# Patient Record
Sex: MALE | Race: WHITE
[De-identification: names, ages, dates, MRNs, and addresses within clinical notes are randomized per-mention and may not be internally consistent; named-entity substitution may affect disease eponyms.]

---

## 2020-09-25 ENCOUNTER — HOSPITAL ENCOUNTER (OUTPATIENT)
Dept: HOSPITAL 53 - M IRPRO | Age: 80
End: 2020-09-25
Attending: SURGERY
Payer: MEDICARE

## 2020-09-25 VITALS — DIASTOLIC BLOOD PRESSURE: 63 MMHG | SYSTOLIC BLOOD PRESSURE: 163 MMHG

## 2020-09-25 DIAGNOSIS — I12.0: ICD-10-CM
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
## 2020-09-25 DIAGNOSIS — T82.590A: Primary | ICD-10-CM

## 2020-09-25 DIAGNOSIS — Z99.2: ICD-10-CM

## 2020-09-25 DIAGNOSIS — Z79.82: ICD-10-CM

## 2020-09-25 DIAGNOSIS — N18.6: ICD-10-CM

## 2020-09-25 DIAGNOSIS — Z79.899: ICD-10-CM

## 2020-09-25 PROCEDURE — 99152 MOD SED SAME PHYS/QHP 5/>YRS: CPT

## 2020-09-25 PROCEDURE — 36902 INTRO CATH DIALYSIS CIRCUIT: CPT

## 2020-09-25 PROCEDURE — 99153 MOD SED SAME PHYS/QHP EA: CPT

## 2020-09-25 NOTE — ROOPDOC
[facility] Report Of Operation


Report of Operation


DATE OF PROCEDURE: 20





PREPROCEDURE DIAGNOSES: End-stage renal disease with increased pulsatility and 

bleeding left upper extremity brachial basilic AV fistula





POSTPROCEDURE DIAGNOSES: Same





PROCEDURE: 


1. Ultrasound-guided access left basilic vein


2. Left upper extremity fistulogram and central venogram


3. Angioplasty left basilic vein and subclavian vein pre-existing stent with 8 x

20 cutting balloon and 9 x 40 Riverhead balloon


4. Angioplasty left subclavian vein pre-existing stent with 10 x 40 conquest 

balloon


5. Completion venogram





SURGEON: Jaxson Brown MD





ANESTHESIA: Local anesthesia with 3 mL lidocaine. Moderate intravenous conscious

sedation with administered by Dr. Brown. The patient was independently 

monitored by registered nurse assigned to the Department of radiology using auto

mated blood pressure, EKG, and pulse oximetry. The detailed sedation record is 

permanently stored in the hospital information system. The following is a brief 

sedation record: Start time 15:42, stop time 16:18, Versed 0.5 mg IV, fentanyl 

50 g IV





CONTRAST: 38 mL Isovue-[id number]





INDICATION FOR PROCEDURE: This is a very pleasant 80-year-old gentleman with 

end-stage renal disease who  increased pulsatility and bleeding times after 

dialysis with his left upper extremity brachial basilic AV fistula. Risks benef

its alternatives to a fistulogram potential intervention were explained to the 

patient needs agreeable to proceed. Informed consent was obtained.





INTERPRETATION:


1. Ultrasound confirmed the AV anastomosis is widely patent.


2. The basilic vein is widely patent over the upper arm and is aneurysmal and 

areas of frequent access. There is one focal area of stenosis, approximately 70%

stenosis distal to one of the aneurysmal segments in the mid upper arm. Proximal

to this in the basilic and axillary vein, there is no significant stenosis. 

There is an 80% stenosis within the pre-existing left subclavian stent, and 

otherwise the central veins are widely patent.


3. After angioplasty of the subclavian stent and the basilic vein with a 8 x 20 

cutting balloon, there was less than 20% residual stenosis in the basilic vein 

and 60% residual stenosis in the subclavian vein. No extravasation was noted.


4. After angioplasty of both areas with a 9 x 40 Riverhead balloon, there is 

widely patent inflow through the basilic vein with no significant residual 

stenosis in no extravasation, and there was an improvement inflow through the 

left subclavian vein but still about 30-40% residual stenosis.


5. After angioplasty of the cephalic vein with a 10 x 40 high-pressure conquest 

balloon at high pressures for 3 minutes, there was less than 20% residual 

stenosis in the subclavian vein stent with widely patent flow and a marked 

improvement in thrill in the fistula. Position was noted.





REPORT OF OPERATION: The patient was brought to the angiographic suite in stable

condition. His left upper extremity was prepped and draped in a sterile fashion.

A timeout was performed. Local anesthesia was administered to skin and 

subcutaneous tissue over the left basilic vein. Ultrasound was used to examine t

he AV anastomosis and it was found to be widely patent. We then gained access to

the basilic vein antegrade fashion with a microneedle under ultrasound guidance.

A wire was passed through this access the needle was removed and a 4 Portuguese 

sheath was placed and flushed with saline. A fistulogram and central venogram 

were performed. Please interpretation above. A Glidewire was advanced through 

this access in the central system and the sheath was exchanged for some Portuguese 

sheath and flushed with saline. A Gleich cath was placed over the wire and the 

wire was exchanged for a 018 wire. We then advanced an 8 x 20 cutting balloon 

first to cross the subclavian stent in multiple inflations were performed. 

Contrast injection revealed some improvement inflow with some residual stenosis 

as described above, but no extravasation. We then angioplasty with the balloon 

in the basilic vein at the area stenosis, and there is a marked improvement 

inflow. Still some residual stenosis was present, no extravasation. We then 

exchange the balloon for 9 x 40 Riverhead balloon. Both areas had a three-minute 

inflations, multiple inflations across the subclavian. Following this there was 

no significant residual stenosis in the basilic vein with a marked rapid 

improvement inflow, but there is still some residual stenosis in subclavian 

vein. We exchange the balloon therefore for a 10 x 40 conquest balloon and 

high-pressure three-minute inflations was performed with a marked improvement 

inflow through the subclavian vein, no significant residual stenosis, no 

extravasation, and a marked improvement in thrill in the fistula. This concluded

the procedure. A figure-of-eight Prolene suture was placed at the sheath after 

anesthetizing with local anesthesia, and the suture was secured is the sheath 

was removed for good hemostasis. Pressure was held for a few minutes and sterile

dressings were applied. The patient was then taken to recovery in stable 

condition. He tolerated the procedure and the sedation well.





ESTIMATED BLOOD LOSS: Approximately 5 mL. 





COMPLICATIONS: None. 





PLAN: It is okay to use the fistula for dialysis. Okay to resume home diet 

medications. Would like to see the patient back in 3 months to check his 

fistula. We appreciate the opportunity to participate in the care of this 

patient.











JAXSON BROWN MD         Sep 25, 2020 16:39

## 2021-03-09 ENCOUNTER — HOSPITAL ENCOUNTER (INPATIENT)
Dept: HOSPITAL 53 - M ED | Age: 81
LOS: 9 days | Discharge: HOME | DRG: 291 | End: 2021-03-18
Attending: INTERNAL MEDICINE | Admitting: FAMILY MEDICINE
Payer: COMMERCIAL

## 2021-03-09 VITALS — BODY MASS INDEX: 27.67 KG/M2 | WEIGHT: 182.54 LBS | HEIGHT: 68 IN

## 2021-03-09 VITALS — OXYGEN SATURATION: 94 %

## 2021-03-09 DIAGNOSIS — Z85.828: ICD-10-CM

## 2021-03-09 DIAGNOSIS — Z95.0: ICD-10-CM

## 2021-03-09 DIAGNOSIS — H54.8: ICD-10-CM

## 2021-03-09 DIAGNOSIS — D63.1: ICD-10-CM

## 2021-03-09 DIAGNOSIS — E87.6: ICD-10-CM

## 2021-03-09 DIAGNOSIS — Z87.891: ICD-10-CM

## 2021-03-09 DIAGNOSIS — E87.5: ICD-10-CM

## 2021-03-09 DIAGNOSIS — J44.0: ICD-10-CM

## 2021-03-09 DIAGNOSIS — B37.81: ICD-10-CM

## 2021-03-09 DIAGNOSIS — E87.1: ICD-10-CM

## 2021-03-09 DIAGNOSIS — Z79.899: ICD-10-CM

## 2021-03-09 DIAGNOSIS — Z90.49: ICD-10-CM

## 2021-03-09 DIAGNOSIS — R13.10: ICD-10-CM

## 2021-03-09 DIAGNOSIS — E11.22: ICD-10-CM

## 2021-03-09 DIAGNOSIS — E11.65: ICD-10-CM

## 2021-03-09 DIAGNOSIS — Z66: ICD-10-CM

## 2021-03-09 DIAGNOSIS — N18.6: ICD-10-CM

## 2021-03-09 DIAGNOSIS — J18.9: ICD-10-CM

## 2021-03-09 DIAGNOSIS — E11.319: ICD-10-CM

## 2021-03-09 DIAGNOSIS — J44.1: ICD-10-CM

## 2021-03-09 DIAGNOSIS — L98.9: ICD-10-CM

## 2021-03-09 DIAGNOSIS — K21.9: ICD-10-CM

## 2021-03-09 DIAGNOSIS — I13.2: Primary | ICD-10-CM

## 2021-03-09 DIAGNOSIS — B37.0: ICD-10-CM

## 2021-03-09 DIAGNOSIS — I95.9: ICD-10-CM

## 2021-03-09 DIAGNOSIS — I50.33: ICD-10-CM

## 2021-03-09 DIAGNOSIS — Z20.822: ICD-10-CM

## 2021-03-09 DIAGNOSIS — Z95.5: ICD-10-CM

## 2021-03-09 DIAGNOSIS — Z99.2: ICD-10-CM

## 2021-03-09 DIAGNOSIS — I48.20: ICD-10-CM

## 2021-03-09 DIAGNOSIS — J96.01: ICD-10-CM

## 2021-03-09 DIAGNOSIS — Z79.82: ICD-10-CM

## 2021-03-09 LAB
ALBUMIN SERPL BCG-MCNC: 3.4 GM/DL (ref 3.2–5.2)
ALT SERPL W P-5'-P-CCNC: 11 U/L (ref 12–78)
BASE EXCESS BLDV CALC-SCNC: 6.5 MMOL/L (ref -2–2)
BASOPHILS # BLD AUTO: 0 10^3/UL (ref 0–0.2)
BASOPHILS NFR BLD AUTO: 0.1 % (ref 0–1)
BILIRUB CONJ SERPL-MCNC: 0.3 MG/DL (ref 0–0.2)
BILIRUB SERPL-MCNC: 0.9 MG/DL (ref 0.2–1)
BUN SERPL-MCNC: 24 MG/DL (ref 7–18)
CALCIUM SERPL-MCNC: 8.3 MG/DL (ref 8.8–10.2)
CHLORIDE SERPL-SCNC: 98 MEQ/L (ref 98–107)
CK MB CFR.DF SERPL CALC: 1.26
CK MB SERPL-MCNC: 1.4 NG/ML (ref ?–3.6)
CK SERPL-CCNC: 111 U/L (ref 39–308)
CO2 BLDV CALC-SCNC: 33.8 MEQ/L (ref 24–28)
CO2 SERPL-SCNC: 32 MEQ/L (ref 21–32)
CREAT SERPL-MCNC: 3.54 MG/DL (ref 0.7–1.3)
EOSINOPHIL # BLD AUTO: 0.1 10^3/UL (ref 0–0.5)
EOSINOPHIL NFR BLD AUTO: 0.7 % (ref 0–3)
GFR SERPL CREATININE-BSD FRML MDRD: 17.7 ML/MIN/{1.73_M2} (ref 35–?)
GLUCOSE SERPL-MCNC: 124 MG/DL (ref 70–100)
HCO3 BLDV-SCNC: 32.2 MEQ/L (ref 23–27)
HCO3 STD BLDV-SCNC: 29.7 MEQ/L
HCT VFR BLD AUTO: 29 % (ref 42–52)
HGB BLD-MCNC: 9 G/DL (ref 13.5–17.5)
INR PPP: 1.35
LYMPHOCYTES # BLD AUTO: 1 10^3/UL (ref 1.5–5)
LYMPHOCYTES NFR BLD AUTO: 8.8 % (ref 24–44)
MCH RBC QN AUTO: 31.8 PG (ref 27–33)
MCHC RBC AUTO-ENTMCNC: 31 G/DL (ref 32–36.5)
MCV RBC AUTO: 102.5 FL (ref 80–96)
MONOCYTES # BLD AUTO: 1 10^3/UL (ref 0–0.8)
MONOCYTES NFR BLD AUTO: 8.9 % (ref 2–8)
NEUTROPHILS # BLD AUTO: 9.1 10^3/UL (ref 1.5–8.5)
NEUTROPHILS NFR BLD AUTO: 81.1 % (ref 36–66)
PCO2 BLDV: 52.5 MMHG (ref 38–50)
PH BLDV: 7.41 UNITS (ref 7.33–7.43)
PLATELET # BLD AUTO: 173 10^3/UL (ref 150–450)
PO2 BLDV: 32.3 MMHG (ref 30–50)
POTASSIUM SERPL-SCNC: 3.3 MEQ/L (ref 3.5–5.1)
PROT SERPL-MCNC: 7.6 GM/DL (ref 6.4–8.2)
PROTHROMBIN TIME: 17 SECONDS (ref 12.5–14.3)
RBC # BLD AUTO: 2.83 10^6/UL (ref 4.3–6.1)
RSV RNA NPH QL NAA+PROBE: NEGATIVE
SAO2 % BLDV: 60.3 % (ref 60–80)
SODIUM SERPL-SCNC: 137 MEQ/L (ref 136–145)
TROPONIN I SERPL-MCNC: 0.03 NG/ML (ref ?–0.1)
WBC # BLD AUTO: 11.2 10^3/UL (ref 4–10)

## 2021-03-09 RX ADMIN — INSULIN LISPRO SCH UNITS: 100 INJECTION, SOLUTION INTRAVENOUS; SUBCUTANEOUS at 21:00

## 2021-03-09 RX ADMIN — SODIUM CHLORIDE SCH UNITS: 4.5 INJECTION, SOLUTION INTRAVENOUS at 22:49

## 2021-03-09 RX ADMIN — METOPROLOL TARTRATE SCH MG: 25 TABLET, FILM COATED ORAL at 22:48

## 2021-03-09 NOTE — REP
INDICATION:

DYSPNEA/COUGH



COMPARISON:

None.



TECHNIQUE:

Portable AP view of the chest



FINDINGS:

Evidence for prior sternotomy and CABG lung fields demonstrate chronic interstitial

changes.  Basilar atelectasis cannot be excluded.  Stent graft overlies the left apex.



IMPRESSION:

Chronic changes.  Possible basilar atelectasis.





<Electronically signed by Po Rodriguez > 03/09/21 [id number]
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
## 2021-03-10 VITALS — SYSTOLIC BLOOD PRESSURE: 104 MMHG | DIASTOLIC BLOOD PRESSURE: 60 MMHG

## 2021-03-10 VITALS — SYSTOLIC BLOOD PRESSURE: 102 MMHG | DIASTOLIC BLOOD PRESSURE: 58 MMHG

## 2021-03-10 VITALS — OXYGEN SATURATION: 92 %

## 2021-03-10 VITALS — OXYGEN SATURATION: 94 %

## 2021-03-10 VITALS — SYSTOLIC BLOOD PRESSURE: 104 MMHG | DIASTOLIC BLOOD PRESSURE: 59 MMHG

## 2021-03-10 VITALS — DIASTOLIC BLOOD PRESSURE: 92 MMHG | SYSTOLIC BLOOD PRESSURE: 102 MMHG

## 2021-03-10 LAB
BASOPHILS # BLD AUTO: 0 10^3/UL (ref 0–0.2)
BASOPHILS NFR BLD AUTO: 0.1 % (ref 0–1)
BUN SERPL-MCNC: 13 MG/DL (ref 7–18)
BUN SERPL-MCNC: 28 MG/DL (ref 7–18)
CALCIUM SERPL-MCNC: 7.9 MG/DL (ref 8.8–10.2)
CALCIUM SERPL-MCNC: 8.3 MG/DL (ref 8.8–10.2)
CHLORIDE SERPL-SCNC: 103 MEQ/L (ref 98–107)
CHLORIDE SERPL-SCNC: 97 MEQ/L (ref 98–107)
CO2 SERPL-SCNC: 29 MEQ/L (ref 21–32)
CO2 SERPL-SCNC: 32 MEQ/L (ref 21–32)
CREAT SERPL-MCNC: 2.37 MG/DL (ref 0.7–1.3)
CREAT SERPL-MCNC: 4.31 MG/DL (ref 0.7–1.3)
EOSINOPHIL # BLD AUTO: 0.1 10^3/UL (ref 0–0.5)
EOSINOPHIL NFR BLD AUTO: 0.9 % (ref 0–3)
GFR SERPL CREATININE-BSD FRML MDRD: 14.1 ML/MIN/{1.73_M2} (ref 35–?)
GFR SERPL CREATININE-BSD FRML MDRD: 28.1 ML/MIN/{1.73_M2} (ref 35–?)
GLUCOSE SERPL-MCNC: 123 MG/DL (ref 70–100)
GLUCOSE SERPL-MCNC: 217 MG/DL (ref 70–100)
HCT VFR BLD AUTO: 25.3 % (ref 42–52)
HGB BLD-MCNC: 7.9 G/DL (ref 13.5–17.5)
LYMPHOCYTES # BLD AUTO: 1 10^3/UL (ref 1.5–5)
LYMPHOCYTES NFR BLD AUTO: 11.2 % (ref 24–44)
MAGNESIUM SERPL-MCNC: 2.1 MG/DL (ref 1.8–2.4)
MCH RBC QN AUTO: 32.2 PG (ref 27–33)
MCHC RBC AUTO-ENTMCNC: 31.2 G/DL (ref 32–36.5)
MCV RBC AUTO: 103.3 FL (ref 80–96)
MONOCYTES # BLD AUTO: 0.9 10^3/UL (ref 0–0.8)
MONOCYTES NFR BLD AUTO: 10 % (ref 2–8)
NEUTROPHILS # BLD AUTO: 6.9 10^3/UL (ref 1.5–8.5)
NEUTROPHILS NFR BLD AUTO: 77.2 % (ref 36–66)
PHOSPHATE SERPL-MCNC: 2.6 MG/DL (ref 2.5–4.9)
PLATELET # BLD AUTO: 148 10^3/UL (ref 150–450)
POTASSIUM SERPL-SCNC: 2.9 MEQ/L (ref 3.5–5.1)
POTASSIUM SERPL-SCNC: 4.3 MEQ/L (ref 3.5–5.1)
RBC # BLD AUTO: 2.45 10^6/UL (ref 4.3–6.1)
SODIUM SERPL-SCNC: 137 MEQ/L (ref 136–145)
SODIUM SERPL-SCNC: 138 MEQ/L (ref 136–145)
WBC # BLD AUTO: 9 10^3/UL (ref 4–10)

## 2021-03-10 PROCEDURE — 5A1D70Z PERFORMANCE OF URINARY FILTRATION, INTERMITTENT, LESS THAN 6 HOURS PER DAY: ICD-10-PCS | Performed by: INTERNAL MEDICINE

## 2021-03-10 RX ADMIN — CALCIUM CARBONATE (ANTACID) CHEW TAB 500 MG SCH MG: 500 CHEW TAB at 13:00

## 2021-03-10 RX ADMIN — DEXTROSE MONOHYDRATE SCH MLS/HR: 5 INJECTION INTRAVENOUS at 21:47

## 2021-03-10 RX ADMIN — SODIUM CHLORIDE SCH UNITS: 4.5 INJECTION, SOLUTION INTRAVENOUS at 05:18

## 2021-03-10 RX ADMIN — IPRATROPIUM BROMIDE AND ALBUTEROL SULFATE SCH ML: .5; 3 SOLUTION RESPIRATORY (INHALATION) at 07:51

## 2021-03-10 RX ADMIN — CALCIUM CARBONATE (ANTACID) CHEW TAB 500 MG SCH MG: 500 CHEW TAB at 08:29

## 2021-03-10 RX ADMIN — SODIUM CHLORIDE SCH UNITS: 4.5 INJECTION, SOLUTION INTRAVENOUS at 16:08

## 2021-03-10 RX ADMIN — IPRATROPIUM BROMIDE AND ALBUTEROL SULFATE SCH ML: .5; 3 SOLUTION RESPIRATORY (INHALATION) at 23:52

## 2021-03-10 RX ADMIN — SODIUM CHLORIDE SCH UNITS: 4.5 INJECTION, SOLUTION INTRAVENOUS at 20:37

## 2021-03-10 RX ADMIN — DEXTROSE MONOHYDRATE SCH MLS/HR: 5 INJECTION INTRAVENOUS at 08:30

## 2021-03-10 RX ADMIN — INSULIN LISPRO SCH UNITS: 100 INJECTION, SOLUTION INTRAVENOUS; SUBCUTANEOUS at 17:30

## 2021-03-10 RX ADMIN — CALCIUM CARBONATE (ANTACID) CHEW TAB 500 MG SCH MG: 500 CHEW TAB at 18:20

## 2021-03-10 RX ADMIN — IPRATROPIUM BROMIDE AND ALBUTEROL SULFATE SCH ML: .5; 3 SOLUTION RESPIRATORY (INHALATION) at 11:26

## 2021-03-10 RX ADMIN — METOPROLOL TARTRATE SCH MG: 25 TABLET, FILM COATED ORAL at 08:12

## 2021-03-10 RX ADMIN — METHYLPREDNISOLONE SODIUM SUCCINATE SCH MG: 125 INJECTION, POWDER, FOR SOLUTION INTRAMUSCULAR; INTRAVENOUS at 20:35

## 2021-03-10 RX ADMIN — INSULIN LISPRO SCH UNITS: 100 INJECTION, SOLUTION INTRAVENOUS; SUBCUTANEOUS at 12:00

## 2021-03-10 RX ADMIN — METOPROLOL TARTRATE SCH MG: 25 TABLET, FILM COATED ORAL at 20:36

## 2021-03-10 RX ADMIN — INSULIN LISPRO SCH UNITS: 100 INJECTION, SOLUTION INTRAVENOUS; SUBCUTANEOUS at 18:52

## 2021-03-10 RX ADMIN — IPRATROPIUM BROMIDE AND ALBUTEROL SULFATE SCH ML: .5; 3 SOLUTION RESPIRATORY (INHALATION) at 03:12

## 2021-03-10 RX ADMIN — INSULIN LISPRO SCH UNITS: 100 INJECTION, SOLUTION INTRAVENOUS; SUBCUTANEOUS at 07:30

## 2021-03-10 RX ADMIN — IPRATROPIUM BROMIDE AND ALBUTEROL SULFATE SCH ML: .5; 3 SOLUTION RESPIRATORY (INHALATION) at 16:21

## 2021-03-10 RX ADMIN — IPRATROPIUM BROMIDE AND ALBUTEROL SULFATE SCH ML: .5; 3 SOLUTION RESPIRATORY (INHALATION) at 19:54

## 2021-03-10 RX ADMIN — INSULIN LISPRO SCH UNITS: 100 INJECTION, SOLUTION INTRAVENOUS; SUBCUTANEOUS at 21:48

## 2021-03-10 RX ADMIN — ASPIRIN SCH MG: 81 TABLET ORAL at 08:29

## 2021-03-10 RX ADMIN — IPRATROPIUM BROMIDE AND ALBUTEROL SULFATE SCH ML: .5; 3 SOLUTION RESPIRATORY (INHALATION) at 00:00

## 2021-03-10 NOTE — IPNPDOC
Date Seen


The patient was seen on 3/10/21.





Progress Note


SUBJECTIVE: 


No acute events overnight. Wheezy this AM, started on IV steroids for COPD 

flare. Denies increased shortness of breath, chest pain, fevers or chills. 

Discussed with nephrology. 





OBJECTIVE: 





PHYSICAL EXAMINATION:


VITAL SIGNS: see below


GENERAL: Alert, comfortable, in no acute distress


HEENT: Normocephalic, atraumatic, PERRLA, sclera anicteric, conjunctiva clear, 

moist oral mucosa 


NECK: Supple, trachea midline, no lymphadenopathy, no elevated JVD appreciated


CARDIOVASCULAR: Regular rate and rhythm, normal S1 and S2. No murmurs, rubs, or 

gallops


RESPIRATORY: Lung sounds are decreased throughout with diffuse end expiratory 

wheezing and scattered rhonchi.


ABDOMEN: Soft, nontender, nondistended, bowel sounds present, no masses or 

hepatosplenomegaly appreciated


EXTREMITIES: No cyanosis or edema. Pulses 2+/4 in bilateral upper and lower 

extremities


SKIN: Red raised lesions on the right frontal region of the scalp which is 

nontender, cool to touch, and without any surround erythema or drainage. Skin 

over bilateral shins is very dry and flaky.


NEUROLOGIC: Alert and oriented x3 to person, place and time. No focal deficits 

appreciated


PSYCHIATRIC: Mood and affect appropriate





LABORATORY DATA: See below.





IMAGING:


CXR : Evidence for prior sternotomy and CABG lung fields demonstrate chronic 

interstitial changes. basilar atelectasis cannot be excluded.  Stent graft 

overlies the left apex.





MICROBIOLOGY: Please see below. 





ASSESSMENT: 80 year old male with PMHx of COPD with recurrent PNA, CAD s/p CABG,

ESRD on HD, HTN, diabetes mellitus, atriall fibrillation, pacemaker placement, 

and blindness, presents with 1 week history of worsening congestion, productive 

cough, chills, and shortness of breath concerning for pneumonia with 

exacerbation of COPD.





PLAN:


Acute hypoxic respiratory failure secondary to ? community aquired pneumonia vs 

COPD exacerbation vs decompensated HF


- CXR shows atelectasis which could represent PNA. Procalcitonin elevated at >2.


- Elevated BNP could be attributed to ESRD, no effusions on CXR, and patient 

does not appear to be volume overloaded on exam so CHF is less likely


- Abx coverage for CAP with ceftriaxone and doxycycline. Sputum culture ordered,

blood cultures x2 pending.


- Check urine legionella ag and strep pneumonia ag


- Duonebs q4h while awake with additional PRN doses available. Incentive 

spirometry and acapella.





COPD with acute exacerbation


- Increased wheezing today


- Switched from PO prednisone to IV methylprednisolone 60 mg IV Q12 H, c/w 

nebulizers ATC and PRN


- Recommend o/p testing 





HFpEF with ? exacerbation 


-BNP >82K, no prior ones on file to compare. 


-No prior echocardiogram on file 


-Stopped lasix as patient is HD/ESRD, ordered echocardiogram- f/u report and 

involve cards if necessary 


-Trop neg 


-C/w BB BID 





ESRD on HD


- S/p HD 3/9/10


- Follows [facility] schedule


- Nephrology consulted , case discussed with Dr. Rae today 





Acute hypokalemia


-Replaced 50 mEq 


-Repeat BMP this evening, replace PRN 


-Daily labs 





HTN


- continue home medications





Diabetes mellitus


- controlled with HD, not on any home medications


- consistent carb diet, SSI ACHS while inpatient


- hypoglycemic protocol





Skin lesion on right temporal scalp


- pt has history of skin cancer with new lesion, needs to establish with o

utpatient local dermatologist for biopsy/excision





DVT prophylaxis: SC heparin





DISPOSITION: Inpatient admission. Nephrology consulted. Will need PT/OT, plan is

for home at discharge





VS, I&O, 24H, Fishbone


Vital Signs/I&O





Vital Signs








  Date Time  Temp Pulse Resp B/P (MAP) Pulse Ox O2 Delivery O2 Flow Rate FiO2


 


3/10/21 15:45 98.3 65 19 104/59 (74) 100 Nasal Cannula 2.0 


 


3/10/21 03:13        36














I&O- Last 24 Hours up to 6 AM 


 


 3/10/21





 06:00


 


Intake Total 905 ml


 


Output Total 0 ml


 


Balance 905 ml











Laboratory Data


24H LABS


Laboratory Tests 2


3/9/21 17:16: 


Prothrombin Time 17.0H, Prothromb Time International Ratio 1.35, Blood Gas 

Bicarbonate Standard 29.7, Venous Blood pH 7.405, Venous Blood Partial Pressure 

CO2 52.5H, Venous Blood Partial Pressure O2 32.3, Venous Blood Total Carbon 

Dioxide 33.8H, Venous Blood HCO3 32.2H, Venous Blood Oxygen Saturation 60.3, 

Venous Blood Base Excess 6.5H, Anion Gap 7L, Glomerular Filtration Rate 17.7L, 

Lactic Acid Level 1.9, Calcium Level 8.3L, Total Bilirubin 0.9, Direct Bilirubin

0.3H, Aspartate Amino Transf (AST/SGOT) 13, Alanine Aminotransferase (ALT/SGPT) 

11L, Alkaline Phosphatase 93, Total Creatine Kinase 111, Creatine Kinase MB 1.4,

Creatine Kinase MB Relative Index 1.26, Troponin I 0.03, NT-Pro-B-Type 

Natriuretic Peptide 37690D, Total Protein 7.6, Albumin 3.4, Albumin/Globulin 

Ratio 0.8


3/9/21 17:17: 


Immature Granulocyte % (Auto) 0.4, Neutrophils (%) (Auto) 81.1H, Lymphocytes (%)

(Auto) 8.8L, Monocytes (%) (Auto) 8.9H, Eosinophils (%) (Auto) 0.7, Basophils 

(%) (Auto) 0.1, Neutrophils # (Auto) 9.1H, Lymphocytes # (Auto) 1.0L, Monocytes 

# (Auto) 1.0H, Eosinophils # (Auto) 0.1, Basophils # (Auto) 0.0, Nucleated Red 

Blood Cells % (auto) 0.0


3/9/21 17:39: 


Coronavirus (COVID-19)(PCR) NEGATIVE, Influenza Type A (RT-PCR) NEGATIVE, 

Influenza Type B (RT-PCR) NEGATIVE, Respiratory Syncytial Virus (PCR) NEGATIVE


3/9/21 21:02: Procalcitonin 2.53


3/9/21 22:25: Bedside Glucose (Misc Panel) 171H


3/10/21 05:53: 


Immature Granulocyte % (Auto) 0.6, Neutrophils (%) (Auto) 77.2H, Lymphocytes (%)

(Auto) 11.2L, Monocytes (%) (Auto) 10.0H, Eosinophils (%) (Auto) 0.9, Basophils 

(%) (Auto) 0.1, Neutrophils # (Auto) 6.9, Lymphocytes # (Auto) 1.0L, Monocytes #

(Auto) 0.9H, Eosinophils # (Auto) 0.1, Basophils # (Auto) 0.0, Nucleated Red 

Blood Cells % (auto) 0.0, Anion Gap 9, Glomerular Filtration Rate 14.1L, Calcium

Level 7.9L, Phosphorus Level 2.6, Magnesium Level 2.1


CBC/BMP


Laboratory Tests


3/9/21 17:16








3/9/21 17:17








3/10/21 05:53








Microbiology





Microbiology


3/10/21 Gram Stain - Final, Resulted


          


3/10/21 Sputum Culture, Resulted


          Pending


3/9/21 Blood Culture, Received


         Pending


3/9/21 Blood Culture, Received


         Pending





Current Medications





Current Medications








 Medications


  (Trade)  Dose


 Ordered  Sig/Riana


 Route


 PRN Reason  Start Time


 Stop Time Status Last Admin


Dose Admin


 


 Acetaminophen


  (Tylenol Tab)  650 mg  Q4H  PRN


 PO


 PAIN OR FEVER  3/9/21 20:15


     





 


 Albuterol/


 Ipratropium


  (Duoneb (Ipr


 0.5mg/Alb 2.5mg))  3 ml  Q2H  PRN


 INH


 WHEEZING  3/9/21 20:15


     





 


 Albuterol/


 Ipratropium


  (Duoneb (Ipr


 0.5mg/Alb 2.5mg))  3 ml  RQ4H


 INH


   3/10/21 00:00


    3/10/21 11:26





 


 Amlodipine


 Besylate


  (Norvasc)  2.5 mg  QHS


 PO


   3/9/21 21:00


 3/10/21 11:46 DC 3/9/21 22:48





 


 Aspirin


  (Ecotrin)  81 mg  DAILY


 PO


   3/10/21 09:00


    3/10/21 08:29





 


 Calcium Carbonate


  (Tums)  500 mg  PC


 PO


   3/10/21 08:30


    3/10/21 08:29





 


 Ceftriaxone


 Sodium 1 gm/


 Dextrose  50 ml @ 


 100 mls/hr  Q24H


 IV


   3/10/21 20:00


     





 


 Dextrose


  (Dextrose 50%)  25 ml  ASDIRECTED  PRN


 IV


 SEE LABEL COMMENTS  3/9/21 21:25


     





 


 Doxycycline


 Hyclate 100 mg/


 Dextrose  100 ml @ 


 100 mls/hr  Q12H


 IV


   3/10/21 08:00


    3/10/21 08:30





 


 Furosemide


  (Lasix)  40 mg  BID@0900,1700


 PO


   3/10/21 09:00


 3/10/21 08:22 DC  





 


 Glucagon


  (Glucagon)  1 mg  ASDIRECTED  PRN


 SC


 SEE LABEL COMMENTS  3/9/21 21:25


     





 


 Glucose


  (Glucose)  16 GM  ASDIRECTED  PRN


 PO


 SEE LABEL COMMENTS  3/9/21 21:25


     





 


 Heparin Sodium


  (Porcine)


  (Heparin)  5,000 units  Q8H


 SC


   3/9/21 22:00


    3/10/21 16:08





 


 Home Med


  (Med Rec


 Complete!)    ASDIRECTED


 XX


   3/9/21 19:40


 3/9/21 19:40 DC  





 


 Insulin Human


 Lispro


  (HumaLOG INSULIN)  SEE


 PROTOCOL


 TABLE  AC


 SC


   3/10/21 07:30


    3/10/21 07:30





 


 Insulin Human


 Lispro


  (HumaLOG INSULIN)  SEE


 PROTOCOL


 TABLE  QHS


 SC


   3/9/21 21:00


     





 


 Methylprednisolone


  (SOLUmedrol)  60 mg  Q12H


 IV


   3/10/21 21:00


     





 


 Metoprolol


 Tartrate


  (Lopressor)  25 mg  BID


 PO


   3/9/21 21:00


    3/9/21 22:48





 


 Prednisone


  (Deltasone)  40 mg  DAILY


 PO


   3/10/21 09:00


 3/10/21 09:26 DC 3/10/21 08:29














Allergies


Coded Allergies:  


     No Known Allergies (Unverified , 9/17/20)











Eliz Jha MD            Mar 10, 2021 16:29
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
## 2021-03-11 VITALS — SYSTOLIC BLOOD PRESSURE: 109 MMHG | DIASTOLIC BLOOD PRESSURE: 61 MMHG

## 2021-03-11 VITALS — OXYGEN SATURATION: 94 %

## 2021-03-11 VITALS — OXYGEN SATURATION: 93 %

## 2021-03-11 VITALS — SYSTOLIC BLOOD PRESSURE: 112 MMHG | DIASTOLIC BLOOD PRESSURE: 60 MMHG

## 2021-03-11 VITALS — SYSTOLIC BLOOD PRESSURE: 104 MMHG | DIASTOLIC BLOOD PRESSURE: 62 MMHG

## 2021-03-11 LAB
ALBUMIN SERPL BCG-MCNC: 3.3 GM/DL (ref 3.2–5.2)
ALT SERPL W P-5'-P-CCNC: 10 U/L (ref 12–78)
BILIRUB SERPL-MCNC: 0.9 MG/DL (ref 0.2–1)
BUN SERPL-MCNC: 26 MG/DL (ref 7–18)
CALCIUM SERPL-MCNC: 8.7 MG/DL (ref 8.8–10.2)
CHLORIDE SERPL-SCNC: 101 MEQ/L (ref 98–107)
CO2 SERPL-SCNC: 23 MEQ/L (ref 21–32)
CREAT SERPL-MCNC: 3.56 MG/DL (ref 0.7–1.3)
GFR SERPL CREATININE-BSD FRML MDRD: 17.6 ML/MIN/{1.73_M2} (ref 35–?)
GLUCOSE SERPL-MCNC: 243 MG/DL (ref 70–100)
HCT VFR BLD AUTO: 27.1 % (ref 42–52)
HGB BLD-MCNC: 8.3 G/DL (ref 13.5–17.5)
MCH RBC QN AUTO: 31.9 PG (ref 27–33)
MCHC RBC AUTO-ENTMCNC: 30.6 G/DL (ref 32–36.5)
MCV RBC AUTO: 104.2 FL (ref 80–96)
PLATELET # BLD AUTO: 155 10^3/UL (ref 150–450)
POTASSIUM SERPL-SCNC: 4.1 MEQ/L (ref 3.5–5.1)
PROT SERPL-MCNC: 7.6 GM/DL (ref 6.4–8.2)
RBC # BLD AUTO: 2.6 10^6/UL (ref 4.3–6.1)
SODIUM SERPL-SCNC: 135 MEQ/L (ref 136–145)
WBC # BLD AUTO: 7.3 10^3/UL (ref 4–10)

## 2021-03-11 RX ADMIN — INSULIN LISPRO SCH UNITS: 100 INJECTION, SOLUTION INTRAVENOUS; SUBCUTANEOUS at 21:00

## 2021-03-11 RX ADMIN — INSULIN LISPRO SCH UNITS: 100 INJECTION, SOLUTION INTRAVENOUS; SUBCUTANEOUS at 17:30

## 2021-03-11 RX ADMIN — ACETAMINOPHEN PRN MG: 325 TABLET ORAL at 06:30

## 2021-03-11 RX ADMIN — SODIUM CHLORIDE SCH UNITS: 4.5 INJECTION, SOLUTION INTRAVENOUS at 21:28

## 2021-03-11 RX ADMIN — ONDANSETRON HYDROCHLORIDE SCH MG: 4 TABLET, FILM COATED ORAL at 17:31

## 2021-03-11 RX ADMIN — ASPIRIN SCH MG: 81 TABLET ORAL at 06:31

## 2021-03-11 RX ADMIN — INSULIN LISPRO SCH UNITS: 100 INJECTION, SOLUTION INTRAVENOUS; SUBCUTANEOUS at 12:00

## 2021-03-11 RX ADMIN — IPRATROPIUM BROMIDE AND ALBUTEROL SULFATE SCH ML: .5; 3 SOLUTION RESPIRATORY (INHALATION) at 15:18

## 2021-03-11 RX ADMIN — IPRATROPIUM BROMIDE AND ALBUTEROL SULFATE SCH ML: .5; 3 SOLUTION RESPIRATORY (INHALATION) at 11:31

## 2021-03-11 RX ADMIN — DOXYCYCLINE HYCLATE SCH MG: 100 TABLET, COATED ORAL at 21:30

## 2021-03-11 RX ADMIN — METHYLPREDNISOLONE SODIUM SUCCINATE SCH MG: 125 INJECTION, POWDER, FOR SOLUTION INTRAMUSCULAR; INTRAVENOUS at 21:29

## 2021-03-11 RX ADMIN — SODIUM CHLORIDE SCH UNITS: 4.5 INJECTION, SOLUTION INTRAVENOUS at 06:30

## 2021-03-11 RX ADMIN — IPRATROPIUM BROMIDE AND ALBUTEROL SULFATE SCH ML: .5; 3 SOLUTION RESPIRATORY (INHALATION) at 07:52

## 2021-03-11 RX ADMIN — SODIUM CHLORIDE SCH UNITS: 4.5 INJECTION, SOLUTION INTRAVENOUS at 14:36

## 2021-03-11 RX ADMIN — METOPROLOL TARTRATE SCH MG: 25 TABLET, FILM COATED ORAL at 21:00

## 2021-03-11 RX ADMIN — DARBEPOETIN ALFA SCH MCG: 100 INJECTION, SOLUTION INTRAVENOUS; SUBCUTANEOUS at 12:26

## 2021-03-11 RX ADMIN — IPRATROPIUM BROMIDE AND ALBUTEROL SULFATE SCH ML: .5; 3 SOLUTION RESPIRATORY (INHALATION) at 23:23

## 2021-03-11 RX ADMIN — ACETAMINOPHEN PRN MG: 325 TABLET ORAL at 21:29

## 2021-03-11 RX ADMIN — CALCIUM CARBONATE (ANTACID) CHEW TAB 500 MG SCH MG: 500 CHEW TAB at 06:31

## 2021-03-11 RX ADMIN — INSULIN LISPRO SCH UNITS: 100 INJECTION, SOLUTION INTRAVENOUS; SUBCUTANEOUS at 08:54

## 2021-03-11 RX ADMIN — IPRATROPIUM BROMIDE AND ALBUTEROL SULFATE SCH ML: .5; 3 SOLUTION RESPIRATORY (INHALATION) at 04:10

## 2021-03-11 RX ADMIN — CALCIUM CARBONATE (ANTACID) CHEW TAB 500 MG SCH MG: 500 CHEW TAB at 14:36

## 2021-03-11 RX ADMIN — METHYLPREDNISOLONE SODIUM SUCCINATE SCH MG: 125 INJECTION, POWDER, FOR SOLUTION INTRAMUSCULAR; INTRAVENOUS at 08:58

## 2021-03-11 RX ADMIN — CALCIUM CARBONATE (ANTACID) CHEW TAB 500 MG SCH MG: 500 CHEW TAB at 17:31

## 2021-03-11 RX ADMIN — DEXTROSE MONOHYDRATE SCH MLS/HR: 5 INJECTION INTRAVENOUS at 08:58

## 2021-03-11 RX ADMIN — IPRATROPIUM BROMIDE AND ALBUTEROL SULFATE SCH ML: .5; 3 SOLUTION RESPIRATORY (INHALATION) at 20:01

## 2021-03-11 RX ADMIN — METOPROLOL TARTRATE SCH MG: 25 TABLET, FILM COATED ORAL at 06:31

## 2021-03-11 NOTE — IPNPDOC
Date Seen


The patient was seen on 3/11/21.





Progress Note


SUBJECTIVE: 


No acute events overnight. Wheezing improved, S/p HD 3/10 and likely to go again

today. Feels lethargic this AM but labs do not look worsened.  Denies increased 

shortness of breath, chest pain, fevers or chills. Discussed with nephrology. 





OBJECTIVE: 





PHYSICAL EXAMINATION:


VITAL SIGNS: see below


GENERAL: Alert, comfortable, in no acute distress


HEENT: Normocephalic, atraumatic, PERRLA, sclera anicteric, conjunctiva clear, 

moist oral mucosa 


NECK: Supple, trachea midline, no lymphadenopathy, no elevated JVD appreciated


CARDIOVASCULAR: Regular rate and rhythm, normal S1 and S2. No murmurs, rubs, or 

gallops


RESPIRATORY: Lung sounds are decreased throughout, no wheezing today on exam- 

improving 


ABDOMEN: Soft, nontender, nondistended, bowel sounds present, no masses or hepat

osplenomegaly appreciated


EXTREMITIES: No cyanosis or edema. Pulses 2+/4 in bilateral upper and lower 

extremities


SKIN: Red raised lesions on the right frontal region of the scalp which is 

nontender, cool to touch, and without any surround erythema or drainage. Skin 

over bilateral shins is very dry and flaky.


NEUROLOGIC: Alert and oriented x3 to person, place and time. No focal deficits 

appreciated


PSYCHIATRIC: Mood and affect appropriate





LABORATORY DATA: See below.





IMAGING:


CXR : Evidence for prior sternotomy and CABG lung fields demonstrate chronic 

interstitial changes. basilar atelectasis cannot be excluded.  Stent graft 

overlies the left apex.





MICROBIOLOGY: Please see below. 





ASSESSMENT: 80 year old male with PMHx of COPD with recurrent PNA, CAD s/p CABG,

ESRD on HD, HTN, diabetes mellitus, atriall fibrillation, pacemaker placement, 

and blindness, presents with 1 week history of worsening congestion, productive 

cough, chills, and shortness of breath concerning for pneumonia with 

exacerbation of COPD.





PLAN:


Acute hypoxic respiratory failure likely MF to community aquired pneumonia vs 

COPD exacerbation vs decompensated HF


- CXR shows atelectasis which could represent PNA. Procalcitonin elevated at >2.


- Elevated BNP could be attributed to ESRD, no effusions on CXR, and patient 

does not appear to be volume overloaded on exam so CHF is less likely


- Abx coverage for CAP with ceftriaxone and doxycycline. Sputum culture ordered,

blood cultures x2 pending.


- Check urine legionella ag and strep pneumonia ag


- Duonebs q4h while awake with additional PRN doses available. Incentive 

spirometry and acapella.





COPD with acute exacerbation


- Improved breathing, no wheezing on exam today 


- IV methylprednisolone 60 mg IV Q12 H, likely can deescalate on 3/12/21 if 

continues to show improvement, c/w nebulizers ATC and PRN


- Recommend o/p testing 





HFpEF with ? exacerbation 


-S/p back to back HD sessions 


-BNP >82K, no prior ones on file to compare. 


-F/u echocardiogram 


-Stopped lasix as patient is HD/ESRD


-Trop neg 


-C/w BB BID 





ESRD on HD


- S/p HD 3/10/10, likely to go again today 


- Follows [facility] schedule


- Nephrology consulted , case discussed with Dr. Rae





Acute hypokalemia


-K wnl this AM 


-Repeat BMP this evening, replace PRN 


-Daily labs 





HTN


- continue home medications





Diabetes mellitus


-BS uncontrolled 


-Started on levemir 8 U QAM 


- consistent carb diet, SSI ACHS while inpatient


- hypoglycemic protocol





Skin lesion on right temporal scalp


- pt has history of skin cancer with new lesion, needs to establish with 

outpatient local dermatologist for biopsy/excision





DVT prophylaxis: SC heparin





DISPOSITION: Inpatient status, nephrology consulted. PT/OT to see. Plan is 

likely home at discharge.





VS, I&O, 24H, Fishbone


Vital Signs/I&O





Vital Signs








  Date Time  Temp Pulse Resp B/P (MAP) Pulse Ox O2 Delivery O2 Flow Rate FiO2


 


3/11/21 07:46       3.0 


 


3/11/21 06:31  60  112/60    


 


3/11/21 06:00 98.4  20  96 Nasal Cannula  


 


3/10/21 19:55        36














I&O- Last 24 Hours up to 6 AM 


 


 3/11/21





 06:00


 


Intake Total 1250 ml


 


Output Total 3020 ml


 


Balance -1770 ml











Laboratory Data


24H LABS


Laboratory Tests 2


3/10/21 16:28: Bedside Glucose (Misc Panel) 167H


3/10/21 17:39: 


Anion Gap 5L, Glomerular Filtration Rate 28.1L, Calcium Level 8.3L


3/10/21 20:35: Bedside Glucose (Misc Panel) 259H


3/11/21 06:45: 


Anion Gap 11, Glomerular Filtration Rate 17.6L, Calcium Level 8.7L, Nucleated 

Red Blood Cells % (auto) 0.0, Total Bilirubin 0.9, Aspartate Amino Transf 

(AST/SGOT) 10, Alanine Aminotransferase (ALT/SGPT) 10L, Alkaline Phosphatase 84,

Total Protein 7.6, Albumin 3.3, Albumin/Globulin Ratio 0.8


3/11/21 14:28: Bedside Glucose (Misc Panel) 146H


CBC/BMP


Laboratory Tests


3/10/21 17:39








3/11/21 06:45








Microbiology





Microbiology


3/10/21 Gram Stain - Final, Resulted


          


3/10/21 Sputum Culture - Preliminary, Resulted


          Yeast Like Organism


3/9/21 Blood Culture - Preliminary, Resulted


         No growth after 24 hours . All specim...


3/9/21 Blood Culture - Preliminary, Resulted


         No growth after 24 hours . All specim...





Current Medications





Current Medications








 Medications


  (Trade)  Dose


 Ordered  Sig/Riana


 Route


 PRN Reason  Start Time


 Stop Time Status Last Admin


Dose Admin


 


 Acetaminophen


  (Tylenol Tab)  650 mg  Q4H  PRN


 PO


 PAIN OR FEVER  3/9/21 20:15


    3/11/21 06:30





 


 Albuterol/


 Ipratropium


  (Duoneb (Ipr


 0.5mg/Alb 2.5mg))  3 ml  Q2H  PRN


 INH


 WHEEZING  3/9/21 20:15


     





 


 Albuterol/


 Ipratropium


  (Duoneb (Ipr


 0.5mg/Alb 2.5mg))  3 ml  RQ4H


 INH


   3/10/21 00:00


    3/11/21 15:18





 


 Amlodipine


 Besylate


  (Norvasc)  2.5 mg  QHS


 PO


   3/9/21 21:00


 3/10/21 11:46 DC 3/9/21 22:48





 


 Aspirin


  (Ecotrin)  81 mg  DAILY


 PO


   3/10/21 09:00


    3/11/21 06:31





 


 Calcium Carbonate


  (Tums)  500 mg  PC


 PO


   3/10/21 08:30


    3/11/21 14:36





 


 Ceftriaxone


 Sodium 1 gm/


 Dextrose  50 ml @ 


 100 mls/hr  Q24H


 IV


   3/10/21 20:00


 3/11/21 09:33 DC 3/10/21 20:35





 


 Darbepoetin Hermilo


  (Aranesp


  (Dialysis Use))  100 mcg  HD


 IV


   3/11/21 11:55


    3/11/21 12:26





 


 Dextrose


  (Dextrose 50%)  25 ml  ASDIRECTED  PRN


 IV


 SEE LABEL COMMENTS  3/9/21 21:25


     





 


 Doxycycline


 Hyclate


  (Vibramycin)  100 mg  BID@0600,2000


 PO


   3/11/21 20:00


     





 


 Doxycycline


 Hyclate 100 mg/


 Dextrose  100 ml @ 


 100 mls/hr  Q12H


 IV


   3/10/21 08:00


 3/11/21 09:33 DC 3/11/21 08:58





 


 Furosemide


  (Lasix)  40 mg  BID@0900,1700


 PO


   3/10/21 09:00


 3/10/21 08:22 DC  





 


 Glucagon


  (Glucagon)  1 mg  ASDIRECTED  PRN


 SC


 SEE LABEL COMMENTS  3/9/21 21:25


     





 


 Glucose


  (Glucose)  16 GM  ASDIRECTED  PRN


 PO


 SEE LABEL COMMENTS  3/9/21 21:25


     





 


 Heparin Sodium


  (Porcine)


  (Heparin)  5,000 units  Q8H


 SC


   3/9/21 22:00


    3/11/21 14:36





 


 Home Med


  (Med Rec


 Complete!)    ASDIRECTED


 XX


   3/9/21 19:40


 3/9/21 19:40 DC  





 


 Insulin Detemir


  (Levemir Insulin)  8 units  QAM


 SC


   3/11/21 09:00


    3/11/21 08:54





 


 Insulin Human


 Lispro


  (HumaLOG INSULIN)  SEE


 PROTOCOL


 TABLE  AC


 SC


   3/10/21 07:30


    3/11/21 08:54





 


 Insulin Human


 Lispro


  (HumaLOG INSULIN)  SEE


 PROTOCOL


 TABLE  QHS


 SC


   3/9/21 21:00


    3/10/21 21:48





 


 Methylprednisolone


  (SOLUmedrol)  60 mg  Q12H


 IV


   3/10/21 21:00


    3/11/21 08:58





 


 Metoprolol


 Tartrate


  (Lopressor)  25 mg  BID


 PO


   3/9/21 21:00


    3/9/21 22:48





 


 Prednisone


  (Deltasone)  40 mg  DAILY


 PO


   3/10/21 09:00


 3/10/21 09:26 DC 3/10/21 08:29














Allergies


Coded Allergies:  


     No Known Allergies (Unverified , 9/17/20)











Eliz Jha MD            Mar 11, 2021 15:46

## 2021-03-11 NOTE — ECGEPIP
[facility] - ED

                                       

                                       Test Date:    2021

Pat Name:     ALVARADO MARTINEZ              Department:   

Patient ID:   [id number]                 Room:         [place] [id number]

Gender:       Male                     Technician:   ty

:          [id number]               Requested By: RASHARD SINGLETON

Order Number: [id number]     Reading MD:   Rashard Light

                                 Measurements

Intervals                              Axis          

Rate:         72                       P:            

IA:                                    QRS:          40

QRSD:         92                       T:            -79

QT:           432                                    

QTc:          473                                    

                           Interpretive Statements

Accelerated Junctional rhythm

ST & Marked T wave abnormality, consider anterolateral ischemia

Prolonged QTc interval

Comparison tracing not on file

Baseline artifact

Electronically Signed on 3- 3:08:41 EST by Rashard Light

## 2021-03-12 VITALS — SYSTOLIC BLOOD PRESSURE: 114 MMHG | DIASTOLIC BLOOD PRESSURE: 51 MMHG

## 2021-03-12 VITALS — SYSTOLIC BLOOD PRESSURE: 116 MMHG | DIASTOLIC BLOOD PRESSURE: 57 MMHG

## 2021-03-12 VITALS — DIASTOLIC BLOOD PRESSURE: 55 MMHG | SYSTOLIC BLOOD PRESSURE: 112 MMHG

## 2021-03-12 VITALS — DIASTOLIC BLOOD PRESSURE: 84 MMHG | SYSTOLIC BLOOD PRESSURE: 115 MMHG

## 2021-03-12 LAB
ALBUMIN SERPL BCG-MCNC: 3.5 GM/DL (ref 3.2–5.2)
ALT SERPL W P-5'-P-CCNC: 17 U/L (ref 12–78)
BASE EXCESS BLDA CALC-SCNC: -4.4 MMOL/L (ref -2–2)
BILIRUB SERPL-MCNC: 0.5 MG/DL (ref 0.2–1)
BUN SERPL-MCNC: 36 MG/DL (ref 7–18)
CALCIUM SERPL-MCNC: 9.2 MG/DL (ref 8.8–10.2)
CHLORIDE SERPL-SCNC: 104 MEQ/L (ref 98–107)
CO2 BLDA CALC-SCNC: 21.6 MEQ/L (ref 23–31)
CO2 SERPL-SCNC: 26 MEQ/L (ref 21–32)
CREAT SERPL-MCNC: 3.49 MG/DL (ref 0.7–1.3)
GFR SERPL CREATININE-BSD FRML MDRD: 18 ML/MIN/{1.73_M2} (ref 35–?)
GLUCOSE SERPL-MCNC: 187 MG/DL (ref 70–100)
HCO3 BLDA-SCNC: 20.4 MEQ/L (ref 22–26)
HCO3 STD BLDA-SCNC: 20.8 MEQ/L (ref 22–26)
HCT VFR BLD AUTO: 30.4 % (ref 42–52)
HGB BLD-MCNC: 9.3 G/DL (ref 13.5–17.5)
MCH RBC QN AUTO: 32.2 PG (ref 27–33)
MCHC RBC AUTO-ENTMCNC: 30.6 G/DL (ref 32–36.5)
MCV RBC AUTO: 105.2 FL (ref 80–96)
PCO2 BLDA: 36.5 MMHG (ref 35–45)
PH BLDA: 7.37 UNITS (ref 7.35–7.45)
PLATELET # BLD AUTO: 213 10^3/UL (ref 150–450)
PO2 BLDA: 110.6 MMHG (ref 75–100)
POTASSIUM SERPL-SCNC: 4.6 MEQ/L (ref 3.5–5.1)
PROT SERPL-MCNC: 7.8 GM/DL (ref 6.4–8.2)
RBC # BLD AUTO: 2.89 10^6/UL (ref 4.3–6.1)
SAO2 % BLDA: 98.2 % (ref 95–99)
SODIUM SERPL-SCNC: 138 MEQ/L (ref 136–145)
TROPONIN I SERPL-MCNC: 0.04 NG/ML (ref ?–0.1)
WBC # BLD AUTO: 12.1 10^3/UL (ref 4–10)

## 2021-03-12 RX ADMIN — CALCIUM CARBONATE (ANTACID) CHEW TAB 500 MG SCH MG: 500 CHEW TAB at 12:35

## 2021-03-12 RX ADMIN — METHYLPREDNISOLONE SODIUM SUCCINATE SCH MG: 125 INJECTION, POWDER, FOR SOLUTION INTRAMUSCULAR; INTRAVENOUS at 12:35

## 2021-03-12 RX ADMIN — SODIUM CHLORIDE SCH UNITS: 4.5 INJECTION, SOLUTION INTRAVENOUS at 06:43

## 2021-03-12 RX ADMIN — IPRATROPIUM BROMIDE AND ALBUTEROL SULFATE SCH ML: .5; 3 SOLUTION RESPIRATORY (INHALATION) at 07:51

## 2021-03-12 RX ADMIN — SODIUM CHLORIDE SCH UNITS: 4.5 INJECTION, SOLUTION INTRAVENOUS at 15:05

## 2021-03-12 RX ADMIN — CALCIUM CARBONATE (ANTACID) CHEW TAB 500 MG SCH MG: 500 CHEW TAB at 08:06

## 2021-03-12 RX ADMIN — DOXYCYCLINE HYCLATE SCH MG: 100 TABLET, COATED ORAL at 06:44

## 2021-03-12 RX ADMIN — INSULIN DETEMIR SCH UNITS: 100 INJECTION, SOLUTION SUBCUTANEOUS at 09:07

## 2021-03-12 RX ADMIN — METHYLPREDNISOLONE SODIUM SUCCINATE SCH MG: 125 INJECTION, POWDER, FOR SOLUTION INTRAMUSCULAR; INTRAVENOUS at 19:29

## 2021-03-12 RX ADMIN — ONDANSETRON HYDROCHLORIDE SCH MG: 4 TABLET, FILM COATED ORAL at 06:44

## 2021-03-12 RX ADMIN — IPRATROPIUM BROMIDE AND ALBUTEROL SULFATE SCH ML: .5; 3 SOLUTION RESPIRATORY (INHALATION) at 03:59

## 2021-03-12 RX ADMIN — IPRATROPIUM BROMIDE AND ALBUTEROL SULFATE SCH ML: .5; 3 SOLUTION RESPIRATORY (INHALATION) at 16:32

## 2021-03-12 RX ADMIN — ASPIRIN SCH MG: 81 TABLET ORAL at 08:06

## 2021-03-12 RX ADMIN — METHYLPREDNISOLONE SODIUM SUCCINATE SCH MG: 125 INJECTION, POWDER, FOR SOLUTION INTRAMUSCULAR; INTRAVENOUS at 08:07

## 2021-03-12 RX ADMIN — INSULIN LISPRO SCH UNITS: 100 INJECTION, SOLUTION INTRAVENOUS; SUBCUTANEOUS at 08:08

## 2021-03-12 RX ADMIN — IPRATROPIUM BROMIDE AND ALBUTEROL SULFATE SCH ML: .5; 3 SOLUTION RESPIRATORY (INHALATION) at 11:36

## 2021-03-12 RX ADMIN — ONDANSETRON HYDROCHLORIDE SCH MG: 4 TABLET, FILM COATED ORAL at 12:35

## 2021-03-12 RX ADMIN — INSULIN LISPRO SCH UNITS: 100 INJECTION, SOLUTION INTRAVENOUS; SUBCUTANEOUS at 12:36

## 2021-03-12 RX ADMIN — IPRATROPIUM BROMIDE AND ALBUTEROL SULFATE SCH ML: .5; 3 SOLUTION RESPIRATORY (INHALATION) at 20:52

## 2021-03-12 RX ADMIN — METOPROLOL TARTRATE SCH MG: 25 TABLET, FILM COATED ORAL at 08:07

## 2021-03-12 RX ADMIN — INSULIN LISPRO SCH UNITS: 100 INJECTION, SOLUTION INTRAVENOUS; SUBCUTANEOUS at 18:13

## 2021-03-12 RX ADMIN — CALCIUM CARBONATE (ANTACID) CHEW TAB 500 MG SCH MG: 500 CHEW TAB at 18:14

## 2021-03-12 RX ADMIN — METOPROLOL TARTRATE SCH MG: 25 TABLET, FILM COATED ORAL at 21:31

## 2021-03-12 RX ADMIN — ONDANSETRON HYDROCHLORIDE SCH MG: 4 TABLET, FILM COATED ORAL at 00:00

## 2021-03-12 RX ADMIN — INSULIN LISPRO SCH UNITS: 100 INJECTION, SOLUTION INTRAVENOUS; SUBCUTANEOUS at 21:00

## 2021-03-12 RX ADMIN — SODIUM CHLORIDE SCH UNITS: 4.5 INJECTION, SOLUTION INTRAVENOUS at 21:31

## 2021-03-12 RX ADMIN — ONDANSETRON HYDROCHLORIDE SCH MG: 4 TABLET, FILM COATED ORAL at 18:14

## 2021-03-12 NOTE — IPNPDOC
Date Seen


The patient was seen on 3/12/21.





Progress Note


SUBJECTIVE: 


Increased SOB this AM, ABG showed pH wnl, O2 adequate. 90% on 4 L NC, increased 

over 24 hours. HAs had three days of HD, no s/s of worsening fluid overload. 

Repeat CXR now worsened. Increased methylprednisolone as he sounds increasingly 

tight, wheezy. Witnessed possible apneic episodes this AM during exam. Also 

complaining of increased chest tightness at times, pleuritic pain with coughing 

and deep breathing. Denies chest pain, fevers or chills. 





OBJECTIVE: 





PHYSICAL EXAMINATION:


VITAL SIGNS: see below


GENERAL: appeared more SOB this AM, lethargic but still Ox3 


HEENT: Normocephalic, atraumatic, PERRLA, sclera anicteric, conjunctiva clear, 

moist oral mucosa 


NECK: Supple, trachea midline, no lymphadenopathy, no elevated JVD appreciated


CARDIOVASCULAR: Regular rate and rhythm, normal S1 and S2. No murmurs, rubs, or 

gallops


RESPIRATORY: Wheezing this AM, decreased breath sounds b/l. No crackles, 

rhonchi.  


ABDOMEN: Soft, nontender, nondistended, bowel sounds present, no masses or 

hepatosplenomegaly appreciated


EXTREMITIES: No cyanosis or edema. Pulses 2+/4 in bilateral upper and lower 

extremities


SKIN: Red raised lesions on the right frontal region of the scalp which is 

nontender, cool to touch, and without any surround erythema or drainage. Skin 

over bilateral shins is very dry and flaky.


NEUROLOGIC: Alert and oriented x3 to person, place and time. No focal deficits 

appreciated





LABORATORY DATA: See below.





IMAGING:





Echocardiogram: 


EF 65% 


Underlying atrial fibrillation/flutter with consistent ventricular paced 

rhythm.Paced QRS complexes with LBBB configuration. 


Somewhat technically challenging study in light of the patients body habitus, 

but diagnostically useful information was still obtained.


M-mode and two-dimensional echocardiography was performed with pulse, 

continuouswave, color flow, and tissue Doppler studies.  


Mild symmetrical left ventricular hypertrophy with septal wall motion 

abnormality due to right ventricle pacing, yet preserved global resting 

systolicfunction.


Moderately dilated left atrium with elevated estimated mean left atrial 

pressure.


At least mildly dilated right ventricle with normal wall motion. Doppler 

estimated pulmonary arterial pressure would be at least moderately severely 

increased.


Moderately dilated right atrium, but could not visualize his inferior vena cava 

to further estimate central venous pressure (we estimated his right ventricle 

systolic pressure using a standard of 10 mmHg for central venous pressure). 


Normal aortic dimensions.


Mild aortic valvular sclerosis with adequate cusp separation, but premature 

cuspclosure suggestive of a reduced forward stroke volume. No evidence of aortic

insufficiency. 


Moderate degenerative changes of the mitral valve apparatus with adequate 

leaflet excursion and no posterior systolic buckling. No inflow tract 

obstruction, but at least mild and possibly moderate insufficiency.


Normal appearing tricuspid valve with moderate insufficiency.


Pacing lead could be visualized traversing right heart structures, but no 

separate intracardiac mass. No pericardial effusion.





CXR 3/12/21: 


Lower lobe airspace disease and small pleural reactions suggested.





CXR : 


Evidence for prior sternotomy and CABG lung fields demonstrate chronic 

interstitial changes. basilar atelectasis cannot be excluded.  Stent graft 

overlies the left apex.





MICROBIOLOGY: Please see below. 





ASSESSMENT: 80 year old male with PMHx of COPD with recurrent PNA, CAD s/p CABG,

ESRD on HD, HTN, diabetes mellitus, atriall fibrillation, pacemaker placement, 

and blindness, presents with 1 week history of worsening congestion, productive 

cough, chills, and shortness of breath concerning for pneumonia with 

exacerbation of COPD.





PLAN:


Acute hypoxic respiratory failure likely MF to community acquired pneumonia, 

COPD exacerbation and decompensated HF


-Increased O2 requirements at 4 L NC, ABG showed pH wnl 


-If continues to decompensate, consider repeat COVID test, CTA chest 


-Treatment of individual issues below 





CAP


-WBC elevated at 12.1; however, likely steroid induced


-Procalcitonin elevated at >2


-Repeat CXR does not show concern for persistent PNA


-Sputum cx: mod yeast 


-On PO levofloxacin





COPD with acute exacerbation


- Wheezing on exam today, more decreased breath sounds 


- IV methylprednisolone 60 mg IV q8H, c/w nebulizers ATC and PRN


- Recommend o/p testing 





HFpEF with ? exacerbation 


-S/p back to back HD sessions x 3


-BNP >82K--> 75,700


-Echo above 


-Stopped lasix as patient is HD/ESRD


-Trop neg 


-C/w BB BID 





ESRD on HD


- S/p HD x3 days in a row 


- Follows [facility] schedule


- Nephrology following





Acute hypokalemia


-K wnl this AM 


-Repeat BMP this evening, replace PRN 


-Daily labs 





HTN


- continue home medications





Diabetes mellitus


-BS better controlled 


-C/w  levemir 12 U QAM, consistent carb diet, SSI ACHS, hypoglycemic protocol





Skin lesion on right temporal scalp


- pt has history of skin cancer with new lesion, needs to establish with 

outpatient local dermatologist for biopsy/excision





DVT prophylaxis: SC heparin





DISPOSITION: Inpatient status, nephrology consulted. PT/OT to see. Plan is 

likely home at discharge.





VS, I&O, 24H, Fishbone


Vital Signs/I&O





Vital Signs








  Date Time  Temp Pulse Resp B/P (MAP) Pulse Ox O2 Delivery O2 Flow Rate FiO2


 


3/12/21 10:20 98.0 75 18 116/57 (76) 89 Nasal Cannula 4.0 


 


3/11/21 20:01        36














I&O- Last 24 Hours up to 6 AM 


 


 3/12/21





 06:00


 


Intake Total 1420 ml


 


Output Total 2500 ml


 


Balance -1080 ml











Laboratory Data


24H LABS


Laboratory Tests 2


3/11/21 14:28: Bedside Glucose (Misc Panel) 146H


3/11/21 16:55: Bedside Glucose (Misc Panel) 303H


3/11/21 20:46: Bedside Glucose (Misc Panel) 146H


3/12/21 05:26: 


Nucleated Red Blood Cells % (auto) 0.0, Anion Gap 8, Glomerular Filtration Rate 

18.0L, Calcium Level 9.2, Total Bilirubin 0.5, Aspartate Amino Transf (AST/SGOT)

14, Alanine Aminotransferase (ALT/SGPT) 17, Alkaline Phosphatase 101, Total 

Protein 7.8, Albumin 3.5, Albumin/Globulin Ratio 0.8


3/12/21 09:41: 


Blood Gas Bicarbonate Standard 20.8L, Arterial Blood pH 7.366, Arterial Blood 

Partial Pressure CO2 36.5, Arterial Blood Partial Pressure O2 110.6H, Arterial 

Blood Total CO2 21.6L, Arterial Blood HCO3 20.4L, Arterial Blood Base Excess -

4.4L, Arterial Blood Oxygen Saturation 98.2


3/12/21 09:47: 


Troponin I 0.04, NT-Pro-B-Type Natriuretic Peptide 56194L


3/12/21 12:04: Bedside Glucose (Misc Panel) 137H


CBC/BMP


Laboratory Tests


3/12/21 05:26








Microbiology





Microbiology


3/10/21 Gram Stain - Final, Complete


          


3/10/21 Sputum Culture - Final, Complete


          Yeast Like Organism


3/9/21 Blood Culture - Preliminary, Resulted


         No Growth after 48 hours. All Specime...


3/9/21 Blood Culture - Preliminary, Resulted


         No Growth after 48 hours. All Specime...











Eliz Jha MD            Mar 12, 2021 14:21

## 2021-03-12 NOTE — ECHO
DATE OF PROCEDURE: 03/10/2021



Age: 80 

Gender: Male  

Height: 68 inches 

Weight: 185 pounds 

Body surface area: 1.98 m2  



PATIENT LOCATION: Inpatient, Room 5135. 



REFERRING PHYSICIAN: Mara Rae M.D. 



INDICATION: Heart failure.  



MEASUREMENTS: 

2D Measurements:   

      RV  4.4 cm 

               LV  5.3 cm 

      Septum 1.3 cm 

      Posterior wall 1.3 cm 

      Aortic Root 3.6 cm

      LA  4.9 cm 

      LVEF 55-65%



Doppler Measurements:

      AV  1.43 m/s

      LVOT  0.63 m/s

      MV-E 135      

      Early mitral deceleration time 156 msec

      E prime medial 7.9, E prime lateral 10.2

      Average E/E prime ratio 14.9/PCWP  20.4 mmHg 

      PV  0.6 m/s

      Pulmonary artery acceleration time 92 msec

      RVSP at least 49 mmHg

      IVC  Could not be visualized  

 

COMMENTS: 

Underlying atrial fibrillation/flutter with consistent ventricular paced rhythm.
Paced QRS complexes with LBBB configuration. 



Somewhat technically challenging study in light of the patients body habitus, 
but diagnostically useful information was still obtained.



M-mode and two-dimensional echocardiography was performed with pulse, continuous
wave, color flow, and tissue Doppler studies.  



Mild symmetrical left ventricular hypertrophy with septal wall motion 
abnormality due to right ventricle pacing, yet preserved global resting systolic
function.



Moderately dilated left atrium with elevated estimated mean left atrial 
pressure.



At least mildly dilated right ventricle with normal wall motion. Doppler 
estimated pulmonary arterial pressure would be at least moderately severely 
increased.



Moderately dilated right atrium, but could not visualize his inferior vena cava 
to further estimate central venous pressure (we estimated his right ventricle 
systolic pressure using a standard of 10 mmHg for central venous pressure). 



Normal aortic dimensions.



Mild aortic valvular sclerosis with adequate cusp separation, but premature cusp
closure suggestive of a reduced forward stroke volume. No evidence of aortic 
insufficiency. 



Moderate degenerative changes of the mitral valve apparatus with adequate 
leaflet excursion and no posterior systolic buckling. No inflow tract 
obstruction, but at least mild and possibly moderate insufficiency.



Normal appearing tricuspid valve with moderate insufficiency.



Pacing lead could be visualized traversing right heart structures, but no 
separate intracardiac mass. No pericardial effusion.

MTDD

## 2021-03-12 NOTE — IPN
PROGRESS NOTE



DATE:  03/11/2021



SUBJECTIVE: Mr. Mendez is seen this morning on his bedside. He is sitting in the

chair today and reports feeling much better. He was dialyzed yesterday and we

took off about 3 liters of fluid with dialysis yesterday. His cough is still

persistent, but much improved. His dyspnea has improved and he denies any fever

or chills. He has no nausea or vomiting. 



PHYSICAL EXAMINATION:

VITALS: Temperature 98.4 degrees Fahrenheit, heart rate 60 per minute,

respiratory rate 20 per minute, blood pressure 112/60 mmHg and oxygen

saturation 96%.

HEENT: Head atraumatic. Neck supple and JVD is improved, but still elevated. 

LUNGS: Bibasilar rales, much improved compared to yesterday.

HEART: Sounds are irregular in rhythm.

ABDOMEN: Soft and nontender. Bowel sounds are normal.

EXTREMITIES: Without any cyanosis or clubbing. AV fistula on left arm is patent.



LABORATORY DATA: Today's labs show WBC 7.3, hemoglobin 8.3, hematocrit 27,

platelets 155,000. Sodium 135, potassium 4.1, CO2 23, BUN 26, creatinine 3.56.

Calcium level 8.7.



PROBLEMS:  

  1.  Shortness of breath and cough: Most likely related to a combination of

      chronic lung disease and volume overload. 3 liters of fluid was removed

      yesterday with dialysis and volume status has improved significantly.

      Will plan to remove another 2 liters today. 

  2.  End-stage renal disease: Patient is regularly dialyzed on Tuesday,

      Thursday and Saturday schedule. Yesterday he had an extra dialysis due to

      volume overload. He will be dialyzed again today. 

  3.  Anemia: His anemia is related to end-stage renal disease. At this point,

      he does not need any emergent transfusion, but we will monitor closely

      and give him one dose of Aranesp 100 mcg today.

## 2021-03-12 NOTE — REP
INDICATION:

incr SOB



COMPARISON:

03/09/2021



TECHNIQUE:

PA and lateral.



FINDINGS:

Mediastinum is stable.  Lower lobe airspace disease with possible small pleural

reactions are suspected.  No pneumothorax.  Skeletal structures stable.



IMPRESSION:

Lower lobe airspace disease and small pleural reactions suggested.





<Electronically signed by Po Rodriguez > 03/12/21 [id number]

## 2021-03-12 NOTE — IPN
NEPHROLOGY PROGRESS NOTE



DATE:  03/12/2021



SUBJECTIVE: Mr. Mendez is seen this morning on his bedside.  He has just been

transferred to telemetry bed this morning due to some chest discomfort and

persistent cough.  He reports that he had an episode of cough for about

one-half hour, as he was feeling a lump in his chest.  His symptoms are much

better now.  He has been dialyzed two days in a row with about 5500 mL fluid

removal.  He denies any nausea or vomiting.  There is no fever or chills.  



PHYSICAL EXAMINATION: 

Temperature 98 degrees Fahrenheit, heart rate 75 per minute, respiratory rate

18 per minute, blood pressure 116/57 mmHg, oxygen saturation between %. 

He is on 3-4 liters oxygen.

HEAD:  Atraumatic.  There is no oral thrush or ulcers.  

NECK:  Supple and jugular venous distention (JVD) is still prominent.

HEART SOUNDS:  Regular at present.

LUNGS:  Bibasilar crepitus.

ABDOMEN:  Soft and nontender.  Bowel sounds are normal.

EXTREMITIES:  Without any cyanosis or clubbing.  Left arm arteriovenous (AV)

fistula is patent.

NEUROLOGIC:  He is awake and at his baseline mentation without focal deficits.



LABORATORY STUDIES:  

Today's labs show WBC 12.1, hemoglobin 9.3, hematocrit 30.4, platelets 213.



Sodium 138, potassium 4.6, BUN 36, creatinine 3.49.



His BNP level is still 75,742 and troponin 0.04.



PROBLEMS:  

1.  End-stage renal disease.  Patient was dialyzed yesterday and will be

scheduled for next dialysis tomorrow.  Electrolytes are within normal range.



2.  Cough and shortness of breath.   We have removed 5.5 liters of fluid over

the last couple of days with dialysis.  We will remove another 3.3 liters of

fluid with dialysis tomorrow.  His BNP level is significantly elevated and we

will see how it improves.



3.  Anemia.  His anemia has improved since we took off a significant amount of

fluid.  No other intervention is needed.  



4.  Recurrent cough.  Etiology is uncertain.  His sputum did show moderate

yeast-like organisms.  I am not sure if he has any fungal infiltrate.

## 2021-03-13 VITALS — SYSTOLIC BLOOD PRESSURE: 90 MMHG | DIASTOLIC BLOOD PRESSURE: 38 MMHG

## 2021-03-13 VITALS — SYSTOLIC BLOOD PRESSURE: 110 MMHG | DIASTOLIC BLOOD PRESSURE: 68 MMHG

## 2021-03-13 VITALS — DIASTOLIC BLOOD PRESSURE: 67 MMHG | SYSTOLIC BLOOD PRESSURE: 130 MMHG

## 2021-03-13 LAB
ALBUMIN SERPL BCG-MCNC: 3.2 GM/DL (ref 3.2–5.2)
ALT SERPL W P-5'-P-CCNC: 21 U/L (ref 12–78)
BILIRUB SERPL-MCNC: 0.4 MG/DL (ref 0.2–1)
BUN SERPL-MCNC: 70 MG/DL (ref 7–18)
CALCIUM SERPL-MCNC: 8.9 MG/DL (ref 8.8–10.2)
CHLORIDE SERPL-SCNC: 101 MEQ/L (ref 98–107)
CO2 SERPL-SCNC: 21 MEQ/L (ref 21–32)
CREAT SERPL-MCNC: 5.09 MG/DL (ref 0.7–1.3)
GFR SERPL CREATININE-BSD FRML MDRD: 11.6 ML/MIN/{1.73_M2} (ref 35–?)
GLUCOSE SERPL-MCNC: 258 MG/DL (ref 70–100)
HCT VFR BLD AUTO: 30.2 % (ref 42–52)
HGB BLD-MCNC: 9.3 G/DL (ref 13.5–17.5)
MCH RBC QN AUTO: 32.2 PG (ref 27–33)
MCHC RBC AUTO-ENTMCNC: 30.8 G/DL (ref 32–36.5)
MCV RBC AUTO: 104.5 FL (ref 80–96)
PLATELET # BLD AUTO: 208 10^3/UL (ref 150–450)
POTASSIUM SERPL-SCNC: 5.7 MEQ/L (ref 3.5–5.1)
PROT SERPL-MCNC: 7.3 GM/DL (ref 6.4–8.2)
RBC # BLD AUTO: 2.89 10^6/UL (ref 4.3–6.1)
SODIUM SERPL-SCNC: 132 MEQ/L (ref 136–145)
WBC # BLD AUTO: 10.7 10^3/UL (ref 4–10)

## 2021-03-13 RX ADMIN — ONDANSETRON HYDROCHLORIDE SCH MG: 4 TABLET, FILM COATED ORAL at 06:34

## 2021-03-13 RX ADMIN — IPRATROPIUM BROMIDE AND ALBUTEROL SULFATE SCH ML: .5; 3 SOLUTION RESPIRATORY (INHALATION) at 20:00

## 2021-03-13 RX ADMIN — SODIUM CHLORIDE SCH UNITS: 4.5 INJECTION, SOLUTION INTRAVENOUS at 06:34

## 2021-03-13 RX ADMIN — INSULIN DETEMIR SCH UNITS: 100 INJECTION, SOLUTION SUBCUTANEOUS at 07:43

## 2021-03-13 RX ADMIN — METOPROLOL TARTRATE SCH MG: 25 TABLET, FILM COATED ORAL at 21:00

## 2021-03-13 RX ADMIN — METOPROLOL TARTRATE SCH MG: 25 TABLET, FILM COATED ORAL at 06:34

## 2021-03-13 RX ADMIN — SODIUM CHLORIDE SCH UNITS: 4.5 INJECTION, SOLUTION INTRAVENOUS at 13:00

## 2021-03-13 RX ADMIN — METHYLPREDNISOLONE SODIUM SUCCINATE SCH MG: 125 INJECTION, POWDER, FOR SOLUTION INTRAMUSCULAR; INTRAVENOUS at 21:01

## 2021-03-13 RX ADMIN — INSULIN LISPRO SCH UNITS: 100 INJECTION, SOLUTION INTRAVENOUS; SUBCUTANEOUS at 13:00

## 2021-03-13 RX ADMIN — CALCIUM CARBONATE (ANTACID) CHEW TAB 500 MG SCH MG: 500 CHEW TAB at 18:26

## 2021-03-13 RX ADMIN — IPRATROPIUM BROMIDE AND ALBUTEROL SULFATE SCH ML: .5; 3 SOLUTION RESPIRATORY (INHALATION) at 03:37

## 2021-03-13 RX ADMIN — ASPIRIN SCH MG: 81 TABLET ORAL at 06:34

## 2021-03-13 RX ADMIN — METHYLPREDNISOLONE SODIUM SUCCINATE SCH MG: 125 INJECTION, POWDER, FOR SOLUTION INTRAMUSCULAR; INTRAVENOUS at 04:53

## 2021-03-13 RX ADMIN — IPRATROPIUM BROMIDE AND ALBUTEROL SULFATE SCH ML: .5; 3 SOLUTION RESPIRATORY (INHALATION) at 00:31

## 2021-03-13 RX ADMIN — INSULIN LISPRO SCH UNITS: 100 INJECTION, SOLUTION INTRAVENOUS; SUBCUTANEOUS at 21:00

## 2021-03-13 RX ADMIN — ONDANSETRON HYDROCHLORIDE SCH MG: 4 TABLET, FILM COATED ORAL at 13:00

## 2021-03-13 RX ADMIN — ONDANSETRON HYDROCHLORIDE SCH MG: 4 TABLET, FILM COATED ORAL at 18:26

## 2021-03-13 RX ADMIN — IPRATROPIUM BROMIDE AND ALBUTEROL SULFATE SCH ML: .5; 3 SOLUTION RESPIRATORY (INHALATION) at 23:29

## 2021-03-13 RX ADMIN — METHYLPREDNISOLONE SODIUM SUCCINATE SCH MG: 125 INJECTION, POWDER, FOR SOLUTION INTRAMUSCULAR; INTRAVENOUS at 13:00

## 2021-03-13 RX ADMIN — SODIUM CHLORIDE SCH UNITS: 4.5 INJECTION, SOLUTION INTRAVENOUS at 21:02

## 2021-03-13 RX ADMIN — INSULIN LISPRO SCH UNITS: 100 INJECTION, SOLUTION INTRAVENOUS; SUBCUTANEOUS at 07:43

## 2021-03-13 RX ADMIN — CALCIUM CARBONATE (ANTACID) CHEW TAB 500 MG SCH MG: 500 CHEW TAB at 13:00

## 2021-03-13 RX ADMIN — IPRATROPIUM BROMIDE AND ALBUTEROL SULFATE SCH ML: .5; 3 SOLUTION RESPIRATORY (INHALATION) at 07:31

## 2021-03-13 RX ADMIN — INSULIN LISPRO SCH UNITS: 100 INJECTION, SOLUTION INTRAVENOUS; SUBCUTANEOUS at 18:26

## 2021-03-13 RX ADMIN — ONDANSETRON HYDROCHLORIDE SCH MG: 4 TABLET, FILM COATED ORAL at 01:18

## 2021-03-13 RX ADMIN — IPRATROPIUM BROMIDE AND ALBUTEROL SULFATE SCH ML: .5; 3 SOLUTION RESPIRATORY (INHALATION) at 11:21

## 2021-03-13 RX ADMIN — IPRATROPIUM BROMIDE AND ALBUTEROL SULFATE SCH ML: .5; 3 SOLUTION RESPIRATORY (INHALATION) at 15:32

## 2021-03-13 RX ADMIN — CALCIUM CARBONATE (ANTACID) CHEW TAB 500 MG SCH MG: 500 CHEW TAB at 06:33

## 2021-03-13 NOTE — IPNPDOC
Date Seen


The patient was seen on 3/13/21.





Progress Note


SUBJECTIVE: 


Low BP after HD ;however, improved later in day. Episode of confusion after 

waking up from nap but this also improved upon more time awake. No change in 

mental status otherwise, remains on 4 L NC with less wheezing today. Chest pain 

only with coughing which is likely MSK. Denies chest pain, fevers or chills. 





OBJECTIVE: 





PHYSICAL EXAMINATION:


VITAL SIGNS: see below


GENERAL: appeared more SOB this AM, lethargic but still Ox3 


HEENT: Normocephalic, atraumatic, PERRLA, sclera anicteric, conjunctiva clear, 

moist oral mucosa 


NECK: Supple, trachea midline, no lymphadenopathy, no elevated JVD appreciated


CARDIOVASCULAR: Regular rate and rhythm, normal S1 and S2. No murmurs, rubs, or 

gallops


RESPIRATORY: mild wheezing- improving, decreased breath sounds b/l. No crackles,

rhonchi.  


ABDOMEN: Soft, nontender, nondistended, bowel sounds present, no masses or 

hepatosplenomegaly appreciated


EXTREMITIES: No cyanosis or edema. Pulses 2+/4 in bilateral upper and lower 

extremities


SKIN: Red raised lesions on the right frontal region of the scalp which is 

nontender, cool to touch, and without any surround erythema or drainage. Skin 

over bilateral shins is very dry and flaky.


NEUROLOGIC: Alert and oriented x3 to person, place and time. No focal deficits 

appreciated





LABORATORY DATA: See below.





IMAGING:





Echocardiogram: 


EF 65% 


Underlying atrial fibrillation/flutter with consistent ventricular paced 

rhythm.Paced QRS complexes with LBBB configuration. 


Somewhat technically challenging study in light of the patients body habitus, 

but diagnostically useful information was still obtained.


M-mode and two-dimensional echocardiography was performed with pulse, 

continuouswave, color flow, and tissue Doppler studies.  


Mild symmetrical left ventricular hypertrophy with septal wall motion 

abnormality due to right ventricle pacing, yet preserved global resting 

systolicfunction.


Moderately dilated left atrium with elevated estimated mean left atrial 

pressure.


At least mildly dilated right ventricle with normal wall motion. Doppler 

estimated pulmonary arterial pressure would be at least moderately severely 

increased.


Moderately dilated right atrium, but could not visualize his inferior vena cava 

to further estimate central venous pressure (we estimated his right ventricle 

systolic pressure using a standard of 10 mmHg for central venous pressure). 


Normal aortic dimensions.


Mild aortic valvular sclerosis with adequate cusp separation, but premature 

cuspclosure suggestive of a reduced forward stroke volume. No evidence of aortic

insufficiency. 


Moderate degenerative changes of the mitral valve apparatus with adequate 

leaflet excursion and no posterior systolic buckling. No inflow tract 

obstruction, but at least mild and possibly moderate insufficiency.


Normal appearing tricuspid valve with moderate insufficiency.


Pacing lead could be visualized traversing right heart structures, but no 

separate intracardiac mass. No pericardial effusion.





CXR 3/12/21: 


Lower lobe airspace disease and small pleural reactions suggested.





CXR : 


Evidence for prior sternotomy and CABG lung fields demonstrate chronic 

interstitial changes. basilar atelectasis cannot be excluded.  Stent graft 

overlies the left apex.





MICROBIOLOGY: Please see below. 





ASSESSMENT: 80 year old male with PMHx of COPD with recurrent PNA, CAD s/p CABG,

ESRD on HD, HTN, diabetes mellitus, atriall fibrillation, pacemaker placement, 

and blindness, presents with 1 week history of worsening congestion, productive 

cough, chills, and shortness of breath concerning for pneumonia with 

exacerbation of COPD.





PLAN:


Acute hypoxic respiratory failure likely multifactorial to community acquired 

pneumonia, COPD exacerbation and decompensated HF


-Still requiring 4 L NC, less wheezy today


-If continues to decompensate, consider repeat COVID test, CTA chest 


-Treatment of individual issues below 





CAP


-WBC 10.7; however, likely steroid induced


-Procalcitonin elevated at >2


-Repeat CXR does not show concern for persistent PNA


-Sputum cx: mod yeast 


-On PO levofloxacin





COPD with acute exacerbation


- Wheezing improved on exam, continued decreased breath sounds 


- C/w IV methylprednisolone 60 mg IV q8H, c/w nebulizers ATC and PRN


- Recommend o/p testing 





Diabetes mellitus


-BS elevated more today, likely steroid driven


-Increased levemir to 18 U QAM, consider HS levemir if levels still high on 3/1

4/21


-C/w consistent carb diet, SSI ACHS, hypoglycemic protocol





HFpEF with ? exacerbation 


-S/p back to back HD sessions, s/p session today 


-BNP >82K--> 75,700


-Echo above 


-Trop neg 


-C/w BB BID 





ESRD on HD


- S/p HD x3 days in a row, again today 


- Follows [facility] schedule


- Nephrology following





Hyperkalemia likely 2/2 to incr Cr, ESRD 


-HD today


-No changes on tele 


-Daily labs 





HTN


- continue home medications





Skin lesion on right temporal scalp


- pt has history of skin cancer with new lesion, needs to establish with 

outpatient local dermatologist for biopsy/excision





DVT prophylaxis: SC heparin





DISPOSITION: Inpatient status, nephrology following. PT/OT to see.





VS, I&O, 24H, Fishbone


Vital Signs/I&O





Vital Signs








  Date Time  Temp Pulse Resp B/P (MAP) Pulse Ox O2 Delivery O2 Flow Rate FiO2


 


3/13/21 14:00 97.6 72 20 90/38 (55) 95 Nasal Cannula 4.0 


 


3/11/21 20:01        36














I&O- Last 24 Hours up to 6 AM 


 


 3/13/21





 06:00


 


Intake Total 1115 ml


 


Output Total 0 ml


 


Balance 1115 ml











Laboratory Data


24H LABS


Laboratory Tests 2


3/12/21 17:16: Bedside Glucose (Misc Panel) 179H


3/12/21 20:34: Bedside Glucose (Misc Panel) 205H


3/13/21 05:33: Bedside Glucose (Misc Panel) 255H


3/13/21 06:11: 


Nucleated Red Blood Cells % (auto) 0.0, Anion Gap 10, Glomerular Filtration Rate

11.6L, Calcium Level 8.9, Total Bilirubin 0.4, Aspartate Amino Transf (AST/SGOT)

15, Alanine Aminotransferase (ALT/SGPT) 21, Alkaline Phosphatase 102, Total 

Protein 7.3, Albumin 3.2, Albumin/Globulin Ratio 0.8


3/13/21 12:53: Bedside Glucose (Misc Panel) 132H


3/13/21 16:26: Bedside Glucose (Misc Panel) 266H


CBC/BMP


Laboratory Tests


3/13/21 06:11








Microbiology





Microbiology


3/10/21 Gram Stain - Final, Complete


          


3/10/21 Sputum Culture - Final, Complete


          Yeast Like Organism


3/9/21 Blood Culture - Preliminary, Resulted


         No Growth after 72 hours. All specime...


3/9/21 Blood Culture - Preliminary, Resulted


         No Growth after 72 hours. All specime...











Eliz Jha MD            Mar 13, 2021 17:13

## 2021-03-13 NOTE — IPN
NEPHROLOGYPROGRESS NOTE



DATE:  03/13/2021



SUBJECTIVE: Patient was seen and examined at the bedside today morning during

hemodialysis. He is tolerating the hemodialysis procedure well. 



OBJECTIVE:

VITAL SIGNS: Temperature 97.6 degrees Fahrenheit, blood pressure 130/67, pulse

69, respiratory rate 17, sating 100% on nasal cannula at 4 liters.

INTAKE AND OUTPUT: There is no urine output recorded. Weight in the bed scale

is not available. 



PHYSICAL EXAMINATION: 

GENERAL: Patient is awake, alert and oriented x3, lying in bed in no apparent

distress. 

HEENT: Extraocular muscles intact. Pupils equally round and reactive to light.

Mucous membranes are moist. Neck is supple. Mildly elevated JVD. 

RESPIRATORY: Chest is clear to auscultation bilaterally. Bilateral equal air

entry. No rales or rhonchi.

CARDIOVASCULAR: S1, S2, regular rate. Trace edema of bilateral lower

extremities.

ABDOMEN: Soft, positive bowel sounds, nontender. No organomegaly. 

MUSCULOSKELETAL: No clubbing or cyanosis. Pulses are 2+. 

AV ACCESS: He has a left upper arm AV fistula, which is being used for

dialysis. 

CNS: No focal deficit. Power is 5/5 in all extremities.



LABORATORY DATA: CBC showed WBC 10.7, hemoglobin 9.3, platelets 208,000. BMP

showed sodium 132, potassium 5.7, chloride 101, bicarb 21, BUN 70, creatinine

5. 



MICROBIOLOGY: Sputum is growing a yeast like organism.



CURRENT INPATIENT MEDICATIONS: Patient's medications were all reviewed by

myself. He continues to be on Levaquin and steroids. No other significant

change in the medications today.



ASSESSMENT AND PLAN:

  1.  End-stage renal disease: Patient is being dialyzed today. Ultrafiltration

      goal is around 3 liters as tolerated by his blood pressure. 

  2.  Acute hypoxic respiratory failure secondary to COPD exacerbation and

      community-acquired pneumonia: Patient is tolerating the fluid removal. He

      continues to be on Levaquin. Clinically he is getting better. 

  3.  Hyperkalemia: Patient is being dialyzed with a 2K bath. No need to repeat

      labs after dialysis.

  4.  Anemia and end-stage renal disease: Continue current dose of Aranesp.

  5.  Diabetes mellitus type 2: Continue current dose of insulin Levemir and

      insulin slicing scale.

## 2021-03-14 VITALS — SYSTOLIC BLOOD PRESSURE: 125 MMHG | DIASTOLIC BLOOD PRESSURE: 76 MMHG

## 2021-03-14 VITALS — SYSTOLIC BLOOD PRESSURE: 115 MMHG | DIASTOLIC BLOOD PRESSURE: 57 MMHG

## 2021-03-14 VITALS — SYSTOLIC BLOOD PRESSURE: 113 MMHG | DIASTOLIC BLOOD PRESSURE: 56 MMHG

## 2021-03-14 LAB
ALBUMIN SERPL BCG-MCNC: 3.4 GM/DL (ref 3.2–5.2)
ALT SERPL W P-5'-P-CCNC: 27 U/L (ref 12–78)
BILIRUB SERPL-MCNC: 0.4 MG/DL (ref 0.2–1)
BUN SERPL-MCNC: 50 MG/DL (ref 7–18)
CALCIUM SERPL-MCNC: 8.7 MG/DL (ref 8.8–10.2)
CHLORIDE SERPL-SCNC: 94 MEQ/L (ref 98–107)
CO2 SERPL-SCNC: 24 MEQ/L (ref 21–32)
CREAT SERPL-MCNC: 4.33 MG/DL (ref 0.7–1.3)
GFR SERPL CREATININE-BSD FRML MDRD: 14 ML/MIN/{1.73_M2} (ref 35–?)
GLUCOSE SERPL-MCNC: 317 MG/DL (ref 70–100)
HCT VFR BLD AUTO: 34.8 % (ref 42–52)
HGB BLD-MCNC: 11.2 G/DL (ref 13.5–17.5)
MCH RBC QN AUTO: 32.2 PG (ref 27–33)
MCHC RBC AUTO-ENTMCNC: 32.2 G/DL (ref 32–36.5)
MCV RBC AUTO: 100 FL (ref 80–96)
PLATELET # BLD AUTO: 303 10^3/UL (ref 150–450)
POTASSIUM SERPL-SCNC: 4.8 MEQ/L (ref 3.5–5.1)
PROT SERPL-MCNC: 7.6 GM/DL (ref 6.4–8.2)
RBC # BLD AUTO: 3.48 10^6/UL (ref 4.3–6.1)
SODIUM SERPL-SCNC: 129 MEQ/L (ref 136–145)
WBC # BLD AUTO: 16.6 10^3/UL (ref 4–10)

## 2021-03-14 RX ADMIN — INSULIN LISPRO SCH UNITS: 100 INJECTION, SOLUTION INTRAVENOUS; SUBCUTANEOUS at 17:41

## 2021-03-14 RX ADMIN — SODIUM CHLORIDE SCH UNITS: 4.5 INJECTION, SOLUTION INTRAVENOUS at 13:08

## 2021-03-14 RX ADMIN — ONDANSETRON HYDROCHLORIDE SCH MG: 4 TABLET, FILM COATED ORAL at 17:41

## 2021-03-14 RX ADMIN — ONDANSETRON HYDROCHLORIDE SCH MG: 4 TABLET, FILM COATED ORAL at 13:07

## 2021-03-14 RX ADMIN — INSULIN LISPRO SCH UNITS: 100 INJECTION, SOLUTION INTRAVENOUS; SUBCUTANEOUS at 07:36

## 2021-03-14 RX ADMIN — IPRATROPIUM BROMIDE AND ALBUTEROL SULFATE SCH ML: .5; 3 SOLUTION RESPIRATORY (INHALATION) at 15:44

## 2021-03-14 RX ADMIN — ONDANSETRON HYDROCHLORIDE SCH MG: 4 TABLET, FILM COATED ORAL at 00:19

## 2021-03-14 RX ADMIN — SODIUM CHLORIDE SCH UNITS: 4.5 INJECTION, SOLUTION INTRAVENOUS at 05:11

## 2021-03-14 RX ADMIN — IPRATROPIUM BROMIDE AND ALBUTEROL SULFATE SCH ML: .5; 3 SOLUTION RESPIRATORY (INHALATION) at 08:11

## 2021-03-14 RX ADMIN — INSULIN LISPRO SCH UNITS: 100 INJECTION, SOLUTION INTRAVENOUS; SUBCUTANEOUS at 13:08

## 2021-03-14 RX ADMIN — IPRATROPIUM BROMIDE AND ALBUTEROL SULFATE SCH ML: .5; 3 SOLUTION RESPIRATORY (INHALATION) at 23:43

## 2021-03-14 RX ADMIN — METHYLPREDNISOLONE SODIUM SUCCINATE SCH MG: 125 INJECTION, POWDER, FOR SOLUTION INTRAMUSCULAR; INTRAVENOUS at 13:08

## 2021-03-14 RX ADMIN — ASPIRIN SCH MG: 81 TABLET ORAL at 09:24

## 2021-03-14 RX ADMIN — CALCIUM CARBONATE (ANTACID) CHEW TAB 500 MG PRN MG: 500 CHEW TAB at 21:11

## 2021-03-14 RX ADMIN — DOCUSATE SODIUM SCH MG: 100 CAPSULE, LIQUID FILLED ORAL at 20:32

## 2021-03-14 RX ADMIN — METHYLPREDNISOLONE SODIUM SUCCINATE SCH MG: 125 INJECTION, POWDER, FOR SOLUTION INTRAMUSCULAR; INTRAVENOUS at 05:10

## 2021-03-14 RX ADMIN — METHYLPREDNISOLONE SODIUM SUCCINATE SCH MG: 125 INJECTION, POWDER, FOR SOLUTION INTRAMUSCULAR; INTRAVENOUS at 20:32

## 2021-03-14 RX ADMIN — IPRATROPIUM BROMIDE AND ALBUTEROL SULFATE SCH ML: .5; 3 SOLUTION RESPIRATORY (INHALATION) at 03:39

## 2021-03-14 RX ADMIN — SENNOSIDES SCH TAB: 8.6 TABLET, FILM COATED ORAL at 20:32

## 2021-03-14 RX ADMIN — CALCIUM CARBONATE (ANTACID) CHEW TAB 500 MG SCH MG: 500 CHEW TAB at 13:08

## 2021-03-14 RX ADMIN — CALCIUM CARBONATE (ANTACID) CHEW TAB 500 MG PRN MG: 500 CHEW TAB at 00:19

## 2021-03-14 RX ADMIN — METOPROLOL TARTRATE SCH MG: 25 TABLET, FILM COATED ORAL at 09:26

## 2021-03-14 RX ADMIN — METOPROLOL TARTRATE SCH MG: 25 TABLET, FILM COATED ORAL at 20:31

## 2021-03-14 RX ADMIN — ONDANSETRON HYDROCHLORIDE SCH MG: 4 TABLET, FILM COATED ORAL at 05:11

## 2021-03-14 RX ADMIN — INSULIN LISPRO SCH UNITS: 100 INJECTION, SOLUTION INTRAVENOUS; SUBCUTANEOUS at 21:00

## 2021-03-14 RX ADMIN — IPRATROPIUM BROMIDE AND ALBUTEROL SULFATE SCH ML: .5; 3 SOLUTION RESPIRATORY (INHALATION) at 11:16

## 2021-03-14 RX ADMIN — SODIUM CHLORIDE SCH UNITS: 4.5 INJECTION, SOLUTION INTRAVENOUS at 20:32

## 2021-03-14 RX ADMIN — IPRATROPIUM BROMIDE AND ALBUTEROL SULFATE SCH ML: .5; 3 SOLUTION RESPIRATORY (INHALATION) at 20:13

## 2021-03-14 RX ADMIN — ONDANSETRON HYDROCHLORIDE SCH MG: 4 TABLET, FILM COATED ORAL at 23:50

## 2021-03-14 RX ADMIN — CALCIUM CARBONATE (ANTACID) CHEW TAB 500 MG SCH MG: 500 CHEW TAB at 09:25

## 2021-03-14 RX ADMIN — METOPROLOL TARTRATE SCH MG: 25 TABLET, FILM COATED ORAL at 09:00

## 2021-03-14 RX ADMIN — CALCIUM CARBONATE (ANTACID) CHEW TAB 500 MG SCH MG: 500 CHEW TAB at 17:42

## 2021-03-14 NOTE — IPN
PROGRESS NOTE



DATE:  03/14/2021



SUBJECTIVE:  Patient was seen and examined at the bedside today morning.  He

reports that he is feeling tired today.  He was dialyzed yesterday, 3 liters of

fluid was removed which he tolerated well.  He reports his cough is

significantly better. 



OBJECTIVE:

Vital signs:  Temperature is 97.4 degrees Fahrenheit, blood pressure 113/56,

pulse is 70, respiratory rate of 18, saturating 99% on nasal cannula at 3

liters.

Intake and output:  Urine output is not recorded.  Ultrafiltration with

hemodialysis was 3 liters.  Weight in the bed scale is not available.



PHYSICAL EXAMINATION: 

General:  Patient is awake, alert, oriented times three, laying in bed.

Head and neck exam:  Extraocular muscles intact.  Pupils equally round and

reactive to light.  Mucous membranes are moist.  Neck is supple.  There is no

jugular venous distension (JVD).

Cardiovascular:  S1, S2, regular rate.  Trace edema of the bilateral lower

extremities.

Respiratory:  Chest is clear to auscultation bilaterally.  Bilateral equal air

entry.  No rales or rhonchi.

Abdomen:  Soft, positive bowel sounds, nontender, no organomegaly.

Musculoskeletal:  No clubbing or cyanosis.  Pulses are 2+.  

Arteriovenous (AV) access:  Left upper arm AV fistula.

Central nervous system (CNS):  No focal deficit.  Power is 5/5 in all

extremities.



LABORATORY REVIEW:  

CBC showed WBC of 16.6, hemoglobin is 11.2, platelets are 303.  



BMP showed sodium 129, potassium 4.8, chloride 94, bicarbonate 24, BUN 50,

creatinine is 4.3.



CURRENT INPATIENT MEDICATIONS:  Patient's medications were all reviewed by

myself.  IV Solu-Medrol has been decreased by myself to 60 mg IV every 12

hours.  No other significant change in the medications today as compared with

yesterday.



ASSESSMENT AND PLAN:  

1.  End-stage renal disease.  Patient was dialyzed yesterday.  Next

hemodialysis will be done on Tuesday.  



2.  Acute hypoxic respiratory failure secondary to chronic obstructive

pulmonary disease (COPD) exacerbation and community-acquired pneumonia. 

Patient's Solu-Medrol is being decreased.  He continues to be on Levaquin.



3.  Anemia in end-stage renal disease.  Continue Aranesp.  Hemoglobin level is

stable. 



4.  Diabetes mellitus type 2.  Continue insulin sliding scale and Levemir.

## 2021-03-14 NOTE — IPNPDOC
Date Seen


The patient was seen on 3/14/21.





Progress Note


SUBJECTIVE: 


BP better improved today, tolerated HD well. Improved breath sounds, decreasing 

steroids. Attempting to wean down O2 further.  Denies chest pain, fevers or 

chills. 





OBJECTIVE: 





PHYSICAL EXAMINATION:


VITAL SIGNS: see below


GENERAL: appeared more SOB this AM, lethargic but still Ox3 


HEENT: Normocephalic, atraumatic, PERRLA, sclera anicteric, conjunctiva clear, 

moist oral mucosa 


NECK: Supple, trachea midline, no lymphadenopathy, no elevated JVD appreciated


CARDIOVASCULAR: Regular rate and rhythm, normal S1 and S2. No murmurs, rubs, or 

gallops


RESPIRATORY: no wheezing-improving, decreased breath sounds b/l but also more 

opened up. No crackles, rhonchi.  


ABDOMEN: Soft, nontender, nondistended, bowel sounds present, no masses or 

hepatosplenomegaly appreciated


EXTREMITIES: No cyanosis or edema. Pulses 2+/4 in bilateral upper and lower 

extremities


SKIN: Red raised lesions on the right frontal region of the scalp which is 

nontender, cool to touch, and without any surround erythema or drainage. Skin 

over bilateral shins is very dry and flaky.


NEUROLOGIC: Alert and oriented x3 to person, place and time. No focal deficits 

appreciated





LABORATORY DATA: See below.





IMAGING:





Echocardiogram: 


EF 65% 


Underlying atrial fibrillation/flutter with consistent ventricular paced 

rhythm.Paced QRS complexes with LBBB configuration. 


Somewhat technically challenging study in light of the patients body habitus, 

but diagnostically useful information was still obtained.


M-mode and two-dimensional echocardiography was performed with pulse, 

continuouswave, color flow, and tissue Doppler studies.  


Mild symmetrical left ventricular hypertrophy with septal wall motion 

abnormality due to right ventricle pacing, yet preserved global resting 

systolicfunction.


Moderately dilated left atrium with elevated estimated mean left atrial 

pressure.


At least mildly dilated right ventricle with normal wall motion. Doppler 

estimated pulmonary arterial pressure would be at least moderately severely 

increased.


Moderately dilated right atrium, but could not visualize his inferior vena cava 

to further estimate central venous pressure (we estimated his right ventricle 

systolic pressure using a standard of 10 mmHg for central venous pressure). 


Normal aortic dimensions.


Mild aortic valvular sclerosis with adequate cusp separation, but premature 

cuspclosure suggestive of a reduced forward stroke volume. No evidence of aortic

insufficiency. 


Moderate degenerative changes of the mitral valve apparatus with adequate 

leaflet excursion and no posterior systolic buckling. No inflow tract ob

struction, but at least mild and possibly moderate insufficiency.


Normal appearing tricuspid valve with moderate insufficiency.


Pacing lead could be visualized traversing right heart structures, but no 

separate intracardiac mass. No pericardial effusion.





CXR 3/12/21: 


Lower lobe airspace disease and small pleural reactions suggested.





CXR : 


Evidence for prior sternotomy and CABG lung fields demonstrate chronic 

interstitial changes. basilar atelectasis cannot be excluded.  Stent graft 

overlies the left apex.





MICROBIOLOGY: Please see below. 





ASSESSMENT: 80 year old male with PMHx of COPD with recurrent PNA, CAD s/p CABG,

ESRD on HD, HTN, diabetes mellitus, atriall fibrillation, pacemaker placement, 

and blindness, presents with 1 week history of worsening congestion, productive 

cough, chills, and shortness of breath concerning for pneumonia with 

exacerbation of COPD.





PLAN:


Acute hypoxic respiratory failure likely multifactorial to community acquired 

pneumonia, COPD exacerbation and decompensated HF


-Still requiring 4 L NC, no wheezing on exam. Attempting to wean down O2 


-Treatment of individual issues below 


-Anticipating patient needing home O2 at discharge. 





CAP


-WBC 16.6 but likely steroid induced


-Procalcitonin elevated at >2


-Repeat CXR does not show concern for persistent PNA


-Sputum cx: mod yeast 


-On PO levofloxacin





COPD with acute exacerbation


- Wheezing resolved, breath sounds improving


- Decrease to IV methylprednisolone 60 mg IV Q12, c/w nebulizers ATC and PRN


- Recommend o/p testing 





Diabetes mellitus


-BS better controlled today and will likely continue to improve as weaning down 

on steroids 


-C/w levemir to 18 U QAM, consider HS levemir if levels still high 


-C/w consistent carb diet, SSI ACHS, hypoglycemic protocol





HFpEF with ? exacerbation 


-S/p back to back HD sessions, s/p session today 


-BNP >82K--> 75,700


-Echo above 


-Trop neg 


-C/w BB BID 





Physical deconditioning, acute on chronic 


-PT  saw on 3/12/21:  pt is shaky upon standing and requires 1 assist at time; 

pt has 24/7 care at home and will likley progress towards a home w/services 

plan.


-F/u PT/OT notes on 3/15/21 after weekend to start planning discharge 





ESRD on HD


- S/p HD 


- Follows [facility] schedule


- Nephrology following





Hyperkalemia likely 2/2 to incr Cr, ESRD 


-HD done and improved K today


-No changes on tele 


-Daily labs 





Hyponatremia likely 2/2 to ESRD 


-Monitor 





Anemia of chronic disease 


-Stable 





HTN


- continue home medications





Skin lesion on right temporal scalp


- pt has history of skin cancer with new lesion, needs to establish with 

outpatient local dermatologist for biopsy/excision





DVT prophylaxis: SC heparin





DISPOSITION: Inpatient status, nephrology following. PT/OT to see again on 

3/15/21- will likely need home O2 at discharge.





VS, I&O, 24H, Fishbone


Vital Signs/I&O





Vital Signs








  Date Time  Temp Pulse Resp B/P (MAP) Pulse Ox O2 Delivery O2 Flow Rate FiO2


 


3/14/21 09:00  79  107/51    


 


3/14/21 06:00 97.3  17  97 Nasal Cannula 4.0 


 


3/11/21 20:01        36














I&O- Last 24 Hours up to 6 AM 


 


 3/14/21





 06:00


 


Intake Total 975 ml


 


Output Total 3000 ml


 


Balance -2025 ml











Laboratory Data


24H LABS


Laboratory Tests 2


3/13/21 16:26: Bedside Glucose (Misc Panel) 266H


3/13/21 20:43: Bedside Glucose (Misc Panel) 149H


3/13/21 23:38: Bedside Glucose (Misc Panel) 201H


3/14/21 06:00: 


Nucleated Red Blood Cells % (auto) 0.6H, Anion Gap 11, Glomerular Filtration 

Rate 14.0L, Calcium Level 8.7L, Total Bilirubin 0.4, Aspartate Amino Transf (AST

/SGOT) 14, Alanine Aminotransferase (ALT/SGPT) 27, Alkaline Phosphatase 106, 

Total Protein 7.6, Albumin 3.4, Albumin/Globulin Ratio 0.8


3/14/21 11:30: Bedside Glucose (Misc Panel) 256H


CBC/BMP


Laboratory Tests


3/14/21 06:00








Microbiology





Microbiology


3/10/21 Gram Stain - Final, Complete


          


3/10/21 Sputum Culture - Final, Complete


          Yeast Like Organism


3/9/21 Blood Culture - Preliminary, Resulted


         No Growth after 72 hours. All specime...


3/9/21 Blood Culture - Preliminary, Resulted


         No Growth after 72 hours. All specime...





Current Medications





Current Medications








 Medications


  (Trade)  Dose


 Ordered  Sig/Riana


 Route


 PRN Reason  Start Time


 Stop Time Status Last Admin


Dose Admin


 


 Acetaminophen


  (Tylenol Tab)  650 mg  Q4H  PRN


 PO


 PAIN OR FEVER  3/9/21 20:15


    3/11/21 21:29





 


 Albuterol/


 Ipratropium


  (Duoneb (Ipr


 0.5mg/Alb 2.5mg))  3 ml  Q2H  PRN


 INH


 WHEEZING  3/9/21 20:15


     





 


 Albuterol/


 Ipratropium


  (Duoneb (Ipr


 0.5mg/Alb 2.5mg))  3 ml  RQ4H


 INH


   3/10/21 00:00


    3/14/21 11:16





 


 Amlodipine


 Besylate


  (Norvasc)  2.5 mg  QHS


 PO


   3/9/21 21:00


 3/10/21 11:46 DC 3/9/21 22:48





 


 Aspirin


  (Ecotrin)  81 mg  DAILY


 PO


   3/10/21 09:00


    3/14/21 09:24





 


 Calcium Carbonate


  (Tums)  500 mg  BID  PRN


 PO


 INDIGESTION  3/13/21 23:50


    3/14/21 00:19





 


 Calcium Carbonate


  (Tums)  500 mg  PC


 PO


   3/10/21 08:30


    3/14/21 13:08





 


 Ceftriaxone


 Sodium 1 gm/


 Dextrose  50 ml @ 


 100 mls/hr  Q24H


 IV


   3/10/21 20:00


 3/11/21 09:33 DC 3/10/21 20:35





 


 Darbepoetin Hermilo


  (Aranesp


  (Dialysis Use))  100 mcg  HD


 IV


   3/11/21 11:55


    3/11/21 12:26





 


 Dextrose


  (Dextrose 50%)  25 ml  ASDIRECTED  PRN


 IV


 SEE LABEL COMMENTS  3/9/21 21:25


     





 


 Doxycycline


 Hyclate


  (Vibramycin)  100 mg  BID@0600,2000


 PO


   3/11/21 20:00


 3/12/21 14:12 DC 3/12/21 06:44





 


 Doxycycline


 Hyclate 100 mg/


 Dextrose  100 ml @ 


 100 mls/hr  Q12H


 IV


   3/10/21 08:00


 3/11/21 09:33 DC 3/11/21 08:58





 


 Furosemide


  (Lasix)  40 mg  BID@0900,1700


 PO


   3/10/21 09:00


 3/10/21 08:22 DC  





 


 Glucagon


  (Glucagon)  1 mg  ASDIRECTED  PRN


 SC


 SEE LABEL COMMENTS  3/9/21 21:25


     





 


 Glucose


  (Glucose)  16 GM  ASDIRECTED  PRN


 PO


 SEE LABEL COMMENTS  3/9/21 21:25


     





 


 Heparin Sodium


  (Heparin)  Please


 refer to


 ...  ASDIRECTED


 XX


   3/13/21 05:40


 3/14/21 05:39 DC  





 


 Heparin Sodium


  (Porcine)


  (Heparin)  5,000 units  Q8H


 SC


   3/9/21 22:00


    3/14/21 05:11





 


 Home Med


  (Med Rec


 Complete!)    ASDIRECTED


 XX


   3/9/21 19:40


 3/9/21 19:40 DC  





 


 Insulin Detemir


  (Levemir Insulin)  8 units  QAM


 SC


   3/11/21 09:00


 3/11/21 17:19 DC 3/11/21 08:54





 


 Insulin Detemir


  (Levemir Insulin)  12 units  QAM


 SC


   3/12/21 09:00


 3/13/21 17:11 DC 3/13/21 07:43





 


 Insulin Detemir


  (Levemir Insulin)  18 units  QAM


 SC


   3/14/21 09:00


    3/14/21 09:25





 


 Insulin Human


 Lispro


  (HumaLOG INSULIN)  SEE


 PROTOCOL


 TABLE  AC


 SC


   3/10/21 07:30


    3/14/21 13:08





 


 Insulin Human


 Lispro


  (HumaLOG INSULIN)  SEE


 PROTOCOL


 TABLE  QHS


 SC


   3/9/21 21:00


    3/10/21 21:48





 


 Levofloxacin


  (Levaquin)  250 mg  Q48H


 PO


   3/12/21 18:00


 3/12/21 15:33 DC  





 


 Levofloxacin


  (Levaquin)  500 mg  Q48H


 PO


   3/14/21 18:00


     





 


 Lidocaine HCl


  (Lidocaine 1%


 Sdv)  0.5 ml  ASDIRECTED  PRN


 SC


 SEE LABEL COMMENTS  3/13/21 05:40


 3/14/21 05:39 DC  





 


 Methylprednisolone


  (SOLUmedrol)  60 mg  Q12H


 IV


   3/10/21 21:00


 3/12/21 11:07 DC 3/11/21 21:29





 


 Methylprednisolone


  (SOLUmedrol)  60 mg  Q8H


 IV


   3/12/21 12:00


    3/14/21 05:10





 


 Metoprolol


 Tartrate


  (Lopressor)  25 mg  BID


 PO


   3/9/21 21:00


    3/13/21 06:34





 


 Ondansetron HCl


  (Zofran)  4 mg  Q6H


 PO


   3/11/21 18:00


    3/14/21 13:07





 


 Prednisone


  (Deltasone)  40 mg  DAILY


 PO


   3/10/21 09:00


 3/10/21 09:26 DC 3/10/21 08:29





 


 Sodium Chloride


  (Nacl 0.9%)  200 ml  ASDIRECTED  PRN


 IV


 SEE LABEL COMMENTS  3/13/21 05:40


 3/14/21 05:39 DC  














Allergies


Coded Allergies:  


     No Known Allergies (Unverified , 9/17/20)











Eliz Jha MD            Mar 14, 2021 13:18

## 2021-03-15 VITALS — DIASTOLIC BLOOD PRESSURE: 61 MMHG | SYSTOLIC BLOOD PRESSURE: 134 MMHG

## 2021-03-15 VITALS — DIASTOLIC BLOOD PRESSURE: 55 MMHG | SYSTOLIC BLOOD PRESSURE: 106 MMHG

## 2021-03-15 VITALS — SYSTOLIC BLOOD PRESSURE: 130 MMHG | DIASTOLIC BLOOD PRESSURE: 59 MMHG

## 2021-03-15 VITALS — SYSTOLIC BLOOD PRESSURE: 127 MMHG | DIASTOLIC BLOOD PRESSURE: 75 MMHG

## 2021-03-15 VITALS — SYSTOLIC BLOOD PRESSURE: 126 MMHG | DIASTOLIC BLOOD PRESSURE: 58 MMHG

## 2021-03-15 VITALS — OXYGEN SATURATION: 100 %

## 2021-03-15 VITALS — OXYGEN SATURATION: 96 %

## 2021-03-15 LAB
ALBUMIN SERPL BCG-MCNC: 3.1 GM/DL (ref 3.2–5.2)
ALT SERPL W P-5'-P-CCNC: 25 U/L (ref 12–78)
BILIRUB SERPL-MCNC: 0.4 MG/DL (ref 0.2–1)
BUN SERPL-MCNC: 89 MG/DL (ref 7–18)
CALCIUM SERPL-MCNC: 8.6 MG/DL (ref 8.8–10.2)
CHLORIDE SERPL-SCNC: 91 MEQ/L (ref 98–107)
CO2 SERPL-SCNC: 26 MEQ/L (ref 21–32)
CREAT SERPL-MCNC: 6.18 MG/DL (ref 0.7–1.3)
GFR SERPL CREATININE-BSD FRML MDRD: 9.3 ML/MIN/{1.73_M2} (ref 35–?)
GLUCOSE SERPL-MCNC: 258 MG/DL (ref 70–100)
HCT VFR BLD AUTO: 33.6 % (ref 42–52)
HGB BLD-MCNC: 10.7 G/DL (ref 13.5–17.5)
MCH RBC QN AUTO: 32.2 PG (ref 27–33)
MCHC RBC AUTO-ENTMCNC: 31.8 G/DL (ref 32–36.5)
MCV RBC AUTO: 101.2 FL (ref 80–96)
PLATELET # BLD AUTO: 286 10^3/UL (ref 150–450)
POTASSIUM SERPL-SCNC: 5.8 MEQ/L (ref 3.5–5.1)
PROT SERPL-MCNC: 7 GM/DL (ref 6.4–8.2)
RBC # BLD AUTO: 3.32 10^6/UL (ref 4.3–6.1)
SODIUM SERPL-SCNC: 128 MEQ/L (ref 136–145)
WBC # BLD AUTO: 18.5 10^3/UL (ref 4–10)

## 2021-03-15 RX ADMIN — CALCIUM CARBONATE (ANTACID) CHEW TAB 500 MG SCH MG: 500 CHEW TAB at 17:20

## 2021-03-15 RX ADMIN — ASPIRIN SCH MG: 81 TABLET ORAL at 09:08

## 2021-03-15 RX ADMIN — INSULIN DETEMIR SCH UNITS: 100 INJECTION, SOLUTION SUBCUTANEOUS at 09:10

## 2021-03-15 RX ADMIN — CALCIUM CARBONATE (ANTACID) CHEW TAB 500 MG SCH MG: 500 CHEW TAB at 12:17

## 2021-03-15 RX ADMIN — NYSTATIN SCH ML: 100000 SUSPENSION ORAL at 17:20

## 2021-03-15 RX ADMIN — IPRATROPIUM BROMIDE AND ALBUTEROL SULFATE SCH ML: .5; 3 SOLUTION RESPIRATORY (INHALATION) at 11:05

## 2021-03-15 RX ADMIN — SODIUM CHLORIDE SCH UNITS: 4.5 INJECTION, SOLUTION INTRAVENOUS at 21:03

## 2021-03-15 RX ADMIN — METOPROLOL TARTRATE SCH MG: 25 TABLET, FILM COATED ORAL at 09:09

## 2021-03-15 RX ADMIN — CALCIUM CARBONATE (ANTACID) CHEW TAB 500 MG SCH MG: 500 CHEW TAB at 09:07

## 2021-03-15 RX ADMIN — SODIUM CHLORIDE SCH UNITS: 4.5 INJECTION, SOLUTION INTRAVENOUS at 06:32

## 2021-03-15 RX ADMIN — POLYETHYLENE GLYCOL 3350 PRN PKT: 17 POWDER, FOR SOLUTION ORAL at 17:20

## 2021-03-15 RX ADMIN — OMEPRAZOLE SCH MG: 20 CAPSULE, DELAYED RELEASE ORAL at 09:09

## 2021-03-15 RX ADMIN — INSULIN LISPRO SCH UNITS: 100 INJECTION, SOLUTION INTRAVENOUS; SUBCUTANEOUS at 12:18

## 2021-03-15 RX ADMIN — NYSTATIN SCH ML: 100000 SUSPENSION ORAL at 21:01

## 2021-03-15 RX ADMIN — ACETAMINOPHEN PRN MG: 325 TABLET ORAL at 01:09

## 2021-03-15 RX ADMIN — FLUCONAZOLE SCH MG: 100 TABLET ORAL at 12:17

## 2021-03-15 RX ADMIN — DOCUSATE SODIUM SCH MG: 100 CAPSULE, LIQUID FILLED ORAL at 09:11

## 2021-03-15 RX ADMIN — SENNOSIDES SCH TAB: 8.6 TABLET, FILM COATED ORAL at 21:02

## 2021-03-15 RX ADMIN — INSULIN DETEMIR SCH UNITS: 100 INJECTION, SOLUTION SUBCUTANEOUS at 21:02

## 2021-03-15 RX ADMIN — ONDANSETRON HYDROCHLORIDE SCH MG: 4 TABLET, FILM COATED ORAL at 17:20

## 2021-03-15 RX ADMIN — SODIUM CHLORIDE SCH UNITS: 4.5 INJECTION, SOLUTION INTRAVENOUS at 14:44

## 2021-03-15 RX ADMIN — ONDANSETRON HYDROCHLORIDE SCH MG: 4 TABLET, FILM COATED ORAL at 06:32

## 2021-03-15 RX ADMIN — IPRATROPIUM BROMIDE AND ALBUTEROL SULFATE SCH ML: .5; 3 SOLUTION RESPIRATORY (INHALATION) at 04:00

## 2021-03-15 RX ADMIN — DOCUSATE SODIUM SCH MG: 100 CAPSULE, LIQUID FILLED ORAL at 21:01

## 2021-03-15 RX ADMIN — IPRATROPIUM BROMIDE AND ALBUTEROL SULFATE SCH ML: .5; 3 SOLUTION RESPIRATORY (INHALATION) at 07:35

## 2021-03-15 RX ADMIN — IPRATROPIUM BROMIDE AND ALBUTEROL SULFATE SCH ML: .5; 3 SOLUTION RESPIRATORY (INHALATION) at 20:39

## 2021-03-15 RX ADMIN — IPRATROPIUM BROMIDE AND ALBUTEROL SULFATE SCH ML: .5; 3 SOLUTION RESPIRATORY (INHALATION) at 15:14

## 2021-03-15 RX ADMIN — INSULIN LISPRO SCH UNITS: 100 INJECTION, SOLUTION INTRAVENOUS; SUBCUTANEOUS at 09:06

## 2021-03-15 RX ADMIN — IPRATROPIUM BROMIDE AND ALBUTEROL SULFATE SCH ML: .5; 3 SOLUTION RESPIRATORY (INHALATION) at 23:54

## 2021-03-15 RX ADMIN — NYSTATIN SCH ML: 100000 SUSPENSION ORAL at 12:16

## 2021-03-15 RX ADMIN — INSULIN LISPRO SCH UNITS: 100 INJECTION, SOLUTION INTRAVENOUS; SUBCUTANEOUS at 21:00

## 2021-03-15 RX ADMIN — ONDANSETRON HYDROCHLORIDE SCH MG: 4 TABLET, FILM COATED ORAL at 12:16

## 2021-03-15 RX ADMIN — INSULIN LISPRO SCH UNITS: 100 INJECTION, SOLUTION INTRAVENOUS; SUBCUTANEOUS at 17:21

## 2021-03-15 RX ADMIN — METOPROLOL TARTRATE SCH MG: 25 TABLET, FILM COATED ORAL at 21:03

## 2021-03-15 NOTE — IPN
NEPHROLOGY PROGRESS NOTE



DATE:  03/15/2021



SUBJECTIVE: The patient was seen and examined at the bedside today morning. The

patient is afebrile, hemodynamically stable. He reports that he has dysphagia

to solids and liquids. He was getting a breathing treatment when I saw him in

the morning. He continues to be on IV antibiotics at this time. His dialysis

date is tomorrow morning. 



OBJECTIVE: 

VITAL SIGNS: Temperature is 97.1 degrees Fahrenheit, blood pressure 106/55,

pulse is 70, respiratory rate of 20, saturating 96% on nasal cannula at one

liter.

INTAKE AND OUTPUT: There is no urine output recorded.

Weight in the bed scale is 83.4 kg.



PHYSICAL EXAMINATION: 

GENERAL APPEARANCE: The patient is awake, alert, oriented x3, laying in bed in

no apparent distress. 

HEAD AND NECK:  Extraocular muscles intact. Pupils are equally round and

reactive to light. Mucous membranes are moist. Neck is supple. There is no

jugular venous distention. 

CARDIOVASCULAR: S1, S2, regular rate.

EXTREMITIES:  Trace edema of the bilateral lower extremities. 

RESPIRATORY: Chest is clear to auscultation bilaterally. Bilaterally currently

no rales or rhonchi. 

ABDOMEN: Soft, positive bowel sounds, nontender, no organomegaly. 

MUSCULOSKELETAL: No clubbing, no cyanosis. Pulses are 2+. 

CNS: No focal deficits. Power is 5/5 in all extremities. 



LAB REVIEW: CBC showed a white blood cell count of 18.5, hemoglobin is 10.7,

platelet count 286. 



BMP showed sodium of 128, potassium 5.8, chloride 91, bicarbonate 26, BUN 89,

creatinine is 6.1. 



BNP is 47,838.



CURRENT INPATIENT MEDICATIONS: The patient's medications were all reviewed by

myself. He has been started on Fluconazole 100 mg p.o. daily for one week and

Solu-Medrol dose has been decreased to 40 mg IV q. 12 hourly. Nystatin is 5 mL

p.o. four times daily; that was started today morning. 



ASSESSMENT AND PLAN:

1.  End-stage renal disease - The patient will be dialyzed tomorrow morning

    according to his regular schedule. Ultrafiltration goal will be around 3.5

    to 4 liters.



2.  Acute hypoxic respiratory failure secondary to COPD exacerbation and

    community acquired pneumonia. The patient's Solu-Medrol is being tapered

    down. He continues to be on IV Levaquin. 



3.  Dysphagia and possible oropharyngeal candidiasis - The patient has a yeast

    and sputum culture. He has been started on Nystatin and Fluconazole.



4.  Anemia and end-stage renal disease - continue Aranesp with dialysis.

## 2021-03-15 NOTE — IPNPDOC
Date Seen


The patient was seen on 3/15/21.





Progress Note


SUBJECTIVE: 


Complains of increased heartburn with meals, will make adjustments to meds. 

Decreased O2 requirements, down to 1 L NC. BNP improving. High K in ESRD 

patient, nephrology notified.  Denies incr SOB, chest pain, fevers, chills. 





OBJECTIVE: 





PHYSICAL EXAMINATION:


VITAL SIGNS: see below


GENERAL: AAOx 3, resting comfortably in bed 


HEENT: Normocephalic, atraumatic, PERRLA, sclera anicteric, conjunctiva clear, 

NC in place 


NECK: Supple, trachea midline, no lymphadenopathy, no elevated JVD appreciated


CARDIOVASCULAR: Regular rate and rhythm, normal S1 and S2. No murmurs, rubs, or 

gallops


RESPIRATORY: no wheezing-improving, decreased breath sounds b/l. No crackles, 

rhonchi.  


ABDOMEN: Soft, nontender, nondistended, bowel sounds present, no masses or 

hepatosplenomegaly appreciated


EXTREMITIES: No cyanosis or edema. Pulses 2+/4 in bilateral upper and lower 

extremities. AV fistula LUE, thrill appreciated 


SKIN: Red raised lesions on the right frontal region of the scalp which is 

nontender, cool to touch, and without any surround erythema or drainage. Skin 

over bilateral shins is very dry and flaky.


NEUROLOGIC: Alert and oriented x3 to person, place and time. No focal deficits 

appreciated





LABORATORY DATA: See below.





IMAGING:





Echocardiogram: 


EF 65% 


Underlying atrial fibrillation/flutter with consistent ventricular paced 

rhythm.Paced QRS complexes with LBBB configuration. 


Somewhat technically challenging study in light of the patients body habitus, 

but diagnostically useful information was still obtained.


M-mode and two-dimensional echocardiography was performed with pulse, 

continuouswave, color flow, and tissue Doppler studies.  


Mild symmetrical left ventricular hypertrophy with septal wall motion 

abnormality due to right ventricle pacing, yet preserved global resting 

systolicfunction.


Moderately dilated left atrium with elevated estimated mean left atrial 

pressure.


At least mildly dilated right ventricle with normal wall motion. Doppler 

estimated pulmonary arterial pressure would be at least moderately severely 

increased.


Moderately dilated right atrium, but could not visualize his inferior vena cava 

to further estimate central venous pressure (we estimated his right ventricle 

systolic pressure using a standard of 10 mmHg for central venous pressure). 


Normal aortic dimensions.


Mild aortic valvular sclerosis with adequate cusp separation, but premature 

cuspclosure suggestive of a reduced forward stroke volume. No evidence of aortic

insufficiency. 


Moderate degenerative changes of the mitral valve apparatus with adequate 

leaflet excursion and no posterior systolic buckling. No inflow tract 

obstruction, but at least mild and possibly moderate insufficiency.


Normal appearing tricuspid valve with moderate insufficiency.


Pacing lead could be visualized traversing right heart structures, but no 

separate intracardiac mass. No pericardial effusion.





CXR 3/12/21: 


Lower lobe airspace disease and small pleural reactions suggested.





CXR : 


Evidence for prior sternotomy and CABG lung fields demonstrate chronic 

interstitial changes. basilar atelectasis cannot be excluded.  Stent graft 

overlies the left apex.





MICROBIOLOGY: Please see below. 





ASSESSMENT: 80 year old male with PMHx of COPD with recurrent PNA, CAD s/p CABG,

ESRD on HD, HTN, diabetes mellitus, atriall fibrillation, pacemaker placement, 

and blindness, presents with 1 week history of worsening congestion, productive 

cough, chills, and shortness of breath concerning for pneumonia with 

exacerbation of COPD.





PLAN:


Acute hypoxic respiratory failure likely multifactorial to community acquired 

pneumonia, COPD exacerbation and decompensated HF


-Decreased O2 requirements, currently 1 L NC 93%, no wheezing on exam. 


-Treatment of individual issues below 





CAP


-WBC 18.5 but likely steroid induced


-Procalcitonin elevated at >2


-Repeat CXR does not show concern for persistent PNA


-Sputum cx: mod yeast 


-On PO levofloxacin





COPD with acute exacerbation


- Wheezing resolved, breath sounds improving


- C/w decreased dose of IV methylprednisolone 60 mg IV Q12, c/w nebulizers ATC 

and PRN


- Recommend o/p testing 





Diabetes mellitus


-BS increased likely 2/2 to steroids 


-AM levemir incr to 25 U, added HS levemir 8 U. 


-C/w consistent carb diet, SSI ACHS, hypoglycemic protocol





HFpEF with exacerbation 


-S/p back to back HD sessions


-BNP >82K--> 75,700-->47K


-Echo above 


-Trop neg 


-C/w BB BID 





Physical deconditioning, acute on chronic 


-PT  saw on 3/12/21:  pt is shaky upon standing and requires 1 assist at time; 

pt has 24/7 care at home and will likley progress towards a home w/services 

plan.


-F/u PT/OT 





ESRD on HD


- S/p HD 


- Follows [facility] schedule


- Nephrology following





Hyperkalemia likely 2/2 to incr Cr, ESRD 


-K 5.8 today


-Discussed with nephrology, c/w HD as scheduled. 


-Daily labs 





Hyponatremia likely 2/2 to ESRD 


-Monitor 





Anemia of chronic disease 


-Stable 


-ARanesp with HD 





HTN


- continue home medications





Skin lesion on right temporal scalp


- pt has history of skin cancer with new lesion, needs to establish with 

outpatient local dermatologist for biopsy/excision





DVT prophylaxis: SC heparin





DISPOSITION: Inpatient status, nephrology following. PT/OT to see again on 

3/15/21. Plan is home with services.





VS, I&O, 24H, Fishbone


Vital Signs/I&O





Vital Signs








  Date Time  Temp Pulse Resp B/P (MAP) Pulse Ox O2 Delivery O2 Flow Rate FiO2


 


3/15/21 10:00       1.0 


 


3/15/21 09:09  70  134/61    


 


3/15/21 08:30 96.7  18  100 Nasal Cannula  


 


3/11/21 20:01        36














I&O- Last 24 Hours up to 6 AM 


 


 3/15/21





 06:00


 


Intake Total 2220 ml


 


Output Total 0 ml


 


Balance 2220 ml











Laboratory Data


24H LABS


Laboratory Tests 2


3/14/21 16:35: Bedside Glucose (Misc Panel) 270H


3/14/21 20:09: Bedside Glucose (Misc Panel) 204H


3/15/21 06:07: 


Nucleated Red Blood Cells % (auto) 0.9H, Anion Gap 11, Glomerular Filtration 

Rate 9.3L, Calcium Level 8.6L, Total Bilirubin 0.4, Aspartate Amino Transf 

(AST/SGOT) 14, Alanine Aminotransferase (ALT/SGPT) 25, Alkaline Phosphatase 113,

NT-Pro-B-Type Natriuretic Peptide 81235G, Total Protein 7.0, Albumin 3.1L, 

Albumin/Globulin Ratio 0.8


3/15/21 11:57: Bedside Glucose (Misc Panel) 195H


CBC/BMP


Laboratory Tests


3/15/21 06:07








Microbiology





Microbiology


3/10/21 Gram Stain - Final, Complete


          


3/10/21 Sputum Culture - Final, Complete


          Yeast Like Organism


3/9/21 Blood Culture - Final, Complete


         NO GROWTH AFTER 5 DAYS


3/9/21 Blood Culture - Final, Complete


         NO GROWTH AFTER 5 DAYS





Current Medications





Current Medications








 Medications


  (Trade)  Dose


 Ordered  Sig/Riana


 Route


 PRN Reason  Start Time


 Stop Time Status Last Admin


Dose Admin


 


 Acetaminophen


  (Tylenol Tab)  650 mg  Q4H  PRN


 PO


 PAIN OR FEVER  3/9/21 20:15


    3/15/21 01:09





 


 Albuterol/


 Ipratropium


  (Duoneb (Ipr


 0.5mg/Alb 2.5mg))  3 ml  Q2H  PRN


 INH


 WHEEZING  3/9/21 20:15


     





 


 Albuterol/


 Ipratropium


  (Duoneb (Ipr


 0.5mg/Alb 2.5mg))  3 ml  RQ4H


 INH


   3/10/21 00:00


    3/15/21 11:05





 


 Amlodipine


 Besylate


  (Norvasc)  2.5 mg  QHS


 PO


   3/9/21 21:00


 3/10/21 11:46 DC 3/9/21 22:48





 


 Aspirin


  (Ecotrin)  81 mg  DAILY


 PO


   3/10/21 09:00


    3/15/21 09:08





 


 Calcium Carbonate


  (Tums)  500 mg  BID  PRN


 PO


 INDIGESTION  3/13/21 23:50


    3/14/21 21:11





 


 Calcium Carbonate


  (Tums)  500 mg  PC


 PO


   3/10/21 08:30


    3/15/21 12:17





 


 Ceftriaxone


 Sodium 1 gm/


 Dextrose  50 ml @ 


 100 mls/hr  Q24H


 IV


   3/10/21 20:00


 3/11/21 09:33 DC 3/10/21 20:35





 


 Darbepoetin Hermilo


  (Aranesp


  (Dialysis Use))  100 mcg  HD


 IV


   3/11/21 11:55


    3/11/21 12:26





 


 Dextrose


  (Dextrose 50%)  25 ml  ASDIRECTED  PRN


 IV


 SEE LABEL COMMENTS  3/9/21 21:25


     





 


 Docusate Sodium


  (Colace)  100 mg  BID


 PO


   3/14/21 21:00


    3/15/21 09:11





 


 Doxycycline


 Hyclate


  (Vibramycin)  100 mg  BID@0600,2000


 PO


   3/11/21 20:00


 3/12/21 14:12 DC 3/12/21 06:44





 


 Doxycycline


 Hyclate 100 mg/


 Dextrose  100 ml @ 


 100 mls/hr  Q12H


 IV


   3/10/21 08:00


 3/11/21 09:33 DC 3/11/21 08:58





 


 Fluconazole


  (Diflucan Tablet)  100 mg  DAILY


 PO


   3/15/21 09:00


 3/23/21 08:59  3/15/21 12:17





 


 Furosemide


  (Lasix)  40 mg  BID@0900,1700


 PO


   3/10/21 09:00


 3/10/21 08:22 DC  





 


 Glucagon


  (Glucagon)  1 mg  ASDIRECTED  PRN


 SC


 SEE LABEL COMMENTS  3/9/21 21:25


     





 


 Glucose


  (Glucose)  16 GM  ASDIRECTED  PRN


 PO


 SEE LABEL COMMENTS  3/9/21 21:25


     





 


 Heparin Sodium


  (Heparin)  Please


 refer to


 ...  ASDIRECTED


 XX


   3/13/21 05:40


 3/14/21 05:39 DC  





 


 Heparin Sodium


  (Porcine)


  (Heparin)  5,000 units  Q8H


 SC


   3/9/21 22:00


    3/15/21 06:32





 


 Home Med


  (Med Rec


 Complete!)    ASDIRECTED


 XX


   3/9/21 19:40


 3/9/21 19:40 DC  





 


 Insulin Detemir


  (Levemir Insulin)  8 units  QAM


 SC


   3/11/21 09:00


 3/11/21 17:19 DC 3/11/21 08:54





 


 Insulin Detemir


  (Levemir Insulin)  8 units  QHS


 SC


   3/15/21 21:00


     





 


 Insulin Detemir


  (Levemir Insulin)  12 units  QAM


 SC


   3/12/21 09:00


 3/13/21 17:11 DC 3/13/21 07:43





 


 Insulin Detemir


  (Levemir Insulin)  18 units  QAM


 SC


   3/14/21 09:00


 3/15/21 08:07 DC 3/14/21 09:25





 


 Insulin Detemir


  (Levemir Insulin)  25 units  QAM


 SC


   3/15/21 09:00


    3/15/21 09:10





 


 Insulin Human


 Lispro


  (HumaLOG INSULIN)  SEE


 PROTOCOL


 TABLE  AC


 SC


   3/10/21 07:30


    3/15/21 12:18





 


 Insulin Human


 Lispro


  (HumaLOG INSULIN)  SEE


 PROTOCOL


 TABLE  QHS


 SC


   3/9/21 21:00


    3/10/21 21:48





 


 Levofloxacin


  (Levaquin)  250 mg  Q48H


 PO


   3/12/21 18:00


 3/12/21 15:33 DC  





 


 Levofloxacin


  (Levaquin)  500 mg  Q48H


 PO


   3/14/21 18:00


    3/14/21 17:41





 


 Lidocaine HCl


  (Lidocaine 1%


 Sdv)  0.5 ml  ASDIRECTED  PRN


 SC


 SEE LABEL COMMENTS  3/13/21 05:40


 3/14/21 05:39 DC  





 


 Methylprednisolone


  (SOLUmedrol)  40 mg  Q12H


 IV


   3/15/21 20:00


     





 


 Methylprednisolone


  (SOLUmedrol)  60 mg  Q12H


 IV


   3/10/21 21:00


 3/12/21 11:07 DC 3/11/21 21:29





 


 Methylprednisolone


  (SOLUmedrol)  60 mg  Q12H


 IV


   3/15/21 08:00


 3/15/21 11:59 DC 3/15/21 09:07





 


 Methylprednisolone


  (SOLUmedrol)  60 mg  Q8H


 IV


   3/12/21 12:00


 3/14/21 20:41 DC 3/14/21 20:32





 


 Metoprolol


 Tartrate


  (Lopressor)  25 mg  BID


 PO


   3/9/21 21:00


    3/15/21 09:09





 


 Nystatin


  (Mycostatin)  5 ml  QID


 PO


   3/15/21 13:00


 3/22/21 12:59  3/15/21 12:16





 


 Omeprazole


  (PriLOSEC)  40 mg  DAILY


 PO


   3/15/21 09:00


    3/15/21 09:09





 


 Ondansetron HCl


  (Zofran)  4 mg  Q6H


 PO


   3/11/21 18:00


    3/15/21 12:16





 


 Polyethylene


 Glycol


  (Miralax)  1 pkt  DAILYPRN  PRN


 PO


 CONSTIPATION  3/14/21 15:05


     





 


 Prednisone


  (Deltasone)  40 mg  DAILY


 PO


   3/10/21 09:00


 3/10/21 09:26 DC 3/10/21 08:29





 


 Senna


  (Senokot)  2 tab  QHS


 PO


   3/14/21 21:00


    3/14/21 20:32





 


 Sodium Chloride


  (Nacl 0.9%)  200 ml  ASDIRECTED  PRN


 IV


 SEE LABEL COMMENTS  3/13/21 05:40


 3/14/21 05:39 DC  














Allergies


Coded Allergies:  


     No Known Allergies (Unverified , 9/17/20)











Eliz Jha MD            Mar 15, 2021 14:06

## 2021-03-16 VITALS — OXYGEN SATURATION: 95 %

## 2021-03-16 VITALS — OXYGEN SATURATION: 97 %

## 2021-03-16 VITALS — SYSTOLIC BLOOD PRESSURE: 104 MMHG | DIASTOLIC BLOOD PRESSURE: 52 MMHG

## 2021-03-16 VITALS — DIASTOLIC BLOOD PRESSURE: 52 MMHG | SYSTOLIC BLOOD PRESSURE: 101 MMHG

## 2021-03-16 VITALS — DIASTOLIC BLOOD PRESSURE: 61 MMHG | SYSTOLIC BLOOD PRESSURE: 125 MMHG

## 2021-03-16 LAB
ALBUMIN SERPL BCG-MCNC: 3 GM/DL (ref 3.2–5.2)
ALT SERPL W P-5'-P-CCNC: 24 U/L (ref 12–78)
BILIRUB SERPL-MCNC: 0.5 MG/DL (ref 0.2–1)
BUN SERPL-MCNC: 118 MG/DL (ref 7–18)
CALCIUM SERPL-MCNC: 8.2 MG/DL (ref 8.8–10.2)
CHLORIDE SERPL-SCNC: 91 MEQ/L (ref 98–107)
CO2 SERPL-SCNC: 23 MEQ/L (ref 21–32)
CREAT SERPL-MCNC: 7.22 MG/DL (ref 0.7–1.3)
GFR SERPL CREATININE-BSD FRML MDRD: 7.8 ML/MIN/{1.73_M2} (ref 35–?)
GLUCOSE SERPL-MCNC: 221 MG/DL (ref 70–100)
HCT VFR BLD AUTO: 30.9 % (ref 42–52)
HGB BLD-MCNC: 10 G/DL (ref 13.5–17.5)
MCH RBC QN AUTO: 32.3 PG (ref 27–33)
MCHC RBC AUTO-ENTMCNC: 32.4 G/DL (ref 32–36.5)
MCV RBC AUTO: 99.7 FL (ref 80–96)
PLATELET # BLD AUTO: 276 10^3/UL (ref 150–450)
POTASSIUM SERPL-SCNC: 6.1 MEQ/L (ref 3.5–5.1)
PROT SERPL-MCNC: 6.6 GM/DL (ref 6.4–8.2)
RBC # BLD AUTO: 3.1 10^6/UL (ref 4.3–6.1)
SODIUM SERPL-SCNC: 126 MEQ/L (ref 136–145)
WBC # BLD AUTO: 21.5 10^3/UL (ref 4–10)

## 2021-03-16 RX ADMIN — ONDANSETRON HYDROCHLORIDE SCH MG: 4 TABLET, FILM COATED ORAL at 17:23

## 2021-03-16 RX ADMIN — DOCUSATE SODIUM SCH MG: 100 CAPSULE, LIQUID FILLED ORAL at 13:39

## 2021-03-16 RX ADMIN — IPRATROPIUM BROMIDE AND ALBUTEROL SULFATE SCH ML: .5; 3 SOLUTION RESPIRATORY (INHALATION) at 19:58

## 2021-03-16 RX ADMIN — CALCIUM CARBONATE (ANTACID) CHEW TAB 500 MG PRN MG: 500 CHEW TAB at 21:38

## 2021-03-16 RX ADMIN — ONDANSETRON HYDROCHLORIDE SCH MG: 4 TABLET, FILM COATED ORAL at 00:36

## 2021-03-16 RX ADMIN — ONDANSETRON HYDROCHLORIDE SCH MG: 4 TABLET, FILM COATED ORAL at 05:56

## 2021-03-16 RX ADMIN — ASPIRIN SCH MG: 81 TABLET ORAL at 13:39

## 2021-03-16 RX ADMIN — IPRATROPIUM BROMIDE AND ALBUTEROL SULFATE SCH ML: .5; 3 SOLUTION RESPIRATORY (INHALATION) at 14:58

## 2021-03-16 RX ADMIN — POLYETHYLENE GLYCOL 3350 PRN PKT: 17 POWDER, FOR SOLUTION ORAL at 13:37

## 2021-03-16 RX ADMIN — SODIUM CHLORIDE SCH UNITS: 4.5 INJECTION, SOLUTION INTRAVENOUS at 05:56

## 2021-03-16 RX ADMIN — OMEPRAZOLE SCH MG: 20 CAPSULE, DELAYED RELEASE ORAL at 13:38

## 2021-03-16 RX ADMIN — NYSTATIN SCH ML: 100000 SUSPENSION ORAL at 17:23

## 2021-03-16 RX ADMIN — NYSTATIN SCH ML: 100000 SUSPENSION ORAL at 21:32

## 2021-03-16 RX ADMIN — DOCUSATE SODIUM SCH MG: 100 CAPSULE, LIQUID FILLED ORAL at 21:00

## 2021-03-16 RX ADMIN — IPRATROPIUM BROMIDE AND ALBUTEROL SULFATE SCH ML: .5; 3 SOLUTION RESPIRATORY (INHALATION) at 03:42

## 2021-03-16 RX ADMIN — INSULIN LISPRO SCH UNITS: 100 INJECTION, SOLUTION INTRAVENOUS; SUBCUTANEOUS at 17:23

## 2021-03-16 RX ADMIN — CALCIUM CARBONATE (ANTACID) CHEW TAB 500 MG SCH MG: 500 CHEW TAB at 08:30

## 2021-03-16 RX ADMIN — ONDANSETRON HYDROCHLORIDE SCH MG: 4 TABLET, FILM COATED ORAL at 13:39

## 2021-03-16 RX ADMIN — IPRATROPIUM BROMIDE AND ALBUTEROL SULFATE SCH ML: .5; 3 SOLUTION RESPIRATORY (INHALATION) at 07:20

## 2021-03-16 RX ADMIN — FLUCONAZOLE SCH MG: 100 TABLET ORAL at 13:38

## 2021-03-16 RX ADMIN — METOPROLOL TARTRATE SCH MG: 25 TABLET, FILM COATED ORAL at 09:00

## 2021-03-16 RX ADMIN — INSULIN LISPRO SCH UNITS: 100 INJECTION, SOLUTION INTRAVENOUS; SUBCUTANEOUS at 07:30

## 2021-03-16 RX ADMIN — CALCIUM CARBONATE (ANTACID) CHEW TAB 500 MG PRN MG: 500 CHEW TAB at 00:36

## 2021-03-16 RX ADMIN — METOPROLOL TARTRATE SCH MG: 25 TABLET, FILM COATED ORAL at 13:39

## 2021-03-16 RX ADMIN — INSULIN DETEMIR SCH UNITS: 100 INJECTION, SOLUTION SUBCUTANEOUS at 21:30

## 2021-03-16 RX ADMIN — SODIUM CHLORIDE SCH UNITS: 4.5 INJECTION, SOLUTION INTRAVENOUS at 21:32

## 2021-03-16 RX ADMIN — CALCIUM CARBONATE (ANTACID) CHEW TAB 500 MG SCH MG: 500 CHEW TAB at 13:38

## 2021-03-16 RX ADMIN — NYSTATIN SCH ML: 100000 SUSPENSION ORAL at 09:00

## 2021-03-16 RX ADMIN — CALCIUM CARBONATE (ANTACID) CHEW TAB 500 MG SCH MG: 500 CHEW TAB at 18:26

## 2021-03-16 RX ADMIN — NYSTATIN SCH ML: 100000 SUSPENSION ORAL at 13:37

## 2021-03-16 RX ADMIN — INSULIN LISPRO SCH UNITS: 100 INJECTION, SOLUTION INTRAVENOUS; SUBCUTANEOUS at 12:00

## 2021-03-16 RX ADMIN — INSULIN DETEMIR SCH UNITS: 100 INJECTION, SOLUTION SUBCUTANEOUS at 09:00

## 2021-03-16 RX ADMIN — IPRATROPIUM BROMIDE AND ALBUTEROL SULFATE SCH ML: .5; 3 SOLUTION RESPIRATORY (INHALATION) at 12:00

## 2021-03-16 RX ADMIN — SENNOSIDES SCH TAB: 8.6 TABLET, FILM COATED ORAL at 21:00

## 2021-03-16 RX ADMIN — METOPROLOL TARTRATE SCH MG: 25 TABLET, FILM COATED ORAL at 21:00

## 2021-03-16 RX ADMIN — SODIUM CHLORIDE SCH UNITS: 4.5 INJECTION, SOLUTION INTRAVENOUS at 13:38

## 2021-03-16 RX ADMIN — INSULIN LISPRO SCH UNITS: 100 INJECTION, SOLUTION INTRAVENOUS; SUBCUTANEOUS at 21:00

## 2021-03-16 NOTE — IPNPDOC
Text Note


Date of Service


The patient was seen on 3/16/21.





NOTE


SUBJECTIVE: 


-Denies incr SOB, chest pain, fevers, chills. 


-Now on room air





OBJECTIVE: 





PHYSICAL EXAMINATION:


VITAL SIGNS: see below


GENERAL: AAOx 3, resting comfortably in bed 


HEENT: Normocephalic, atraumatic, PERRLA, sclera anicteric, conjunctiva clear, 

NC in place 


NECK: Supple, trachea midline, no lymphadenopathy, no elevated JVD appreciated


CARDIOVASCULAR: Regular rate and rhythm, normal S1 and S2. No murmurs, rubs, or 

gallops


RESPIRATORY: no wheezing, decreased breath sounds b/l. No crackles, rhonchi.  


ABDOMEN: Soft, nontender, nondistended, bowel sounds present, no masses or 

hepatosplenomegaly appreciated


EXTREMITIES: No cyanosis or edema. Pulses 2+/4 in bilateral upper and lower 

extremities. AV fistula LUE, thrill appreciated 


SKIN: Red raised lesions on the right frontal region of the scalp which is 

nontender, cool to touch, and without any surround erythema or drainage. Skin 

over bilateral shins is very dry and flaky.


NEUROLOGIC: Alert and oriented x3 to person, place and time. No focal deficits 

appreciated





LABORATORY DATA: Reviewed


WBC 21.5


Hgb 10.1


Na 126


Cr 7.22


K 6.1





IMAGING:





Echocardiogram: 


EF 65% 


Underlying atrial fibrillation/flutter with consistent ventricular paced 

rhythm.Paced QRS complexes with LBBB configuration. 


Somewhat technically challenging study in light of the patients body habitus, 

but diagnostically useful information was still obtained.


M-mode and two-dimensional echocardiography was performed with pulse, cont

inuouswave, color flow, and tissue Doppler studies.  


Mild symmetrical left ventricular hypertrophy with septal wall motion 

abnormality due to right ventricle pacing, yet preserved global resting syst

olicfunction.


Moderately dilated left atrium with elevated estimated mean left atrial 

pressure.


At least mildly dilated right ventricle with normal wall motion. Doppler 

estimated pulmonary arterial pressure would be at least moderately severely 

increased.


Moderately dilated right atrium, but could not visualize his inferior vena cava 

to further estimate central venous pressure (we estimated his right ventricle 

systolic pressure using a standard of 10 mmHg for central venous pressure). 


Normal aortic dimensions.


Mild aortic valvular sclerosis with adequate cusp separation, but premature 

cuspclosure suggestive of a reduced forward stroke volume. No evidence of aortic

insufficiency. 


Moderate degenerative changes of the mitral valve apparatus with adequate 

leaflet excursion and no posterior systolic buckling. No inflow tract 

obstruction, but at least mild and possibly moderate insufficiency.


Normal appearing tricuspid valve with moderate insufficiency.


Pacing lead could be visualized traversing right heart structures, but no 

separate intracardiac mass. No pericardial effusion.





CXR 3/12/21: 


Lower lobe airspace disease and small pleural reactions suggested.





CXR : 


Evidence for prior sternotomy and CABG lung fields demonstrate chronic 

interstitial changes. basilar atelectasis cannot be excluded.  Stent graft 

overlies the left apex.





MICROBIOLOGY: Please see below. 





ASSESSMENT: 80 year old male with PMHx of COPD with recurrent PNA, CAD s/p CABG,

ESRD on HD, HTN, diabetes mellitus, atriall fibrillation, pacemaker placement, 

and blindness, presents with 1 week history of worsening congestion, productive 

cough, chills, and shortness of breath concerning for pneumonia with exace

rbation of COPD.





PLAN:


Acute hypoxic respiratory failure likely multifactorial to community acquired 

pneumonia, COPD exacerbation and decompensated CHF


-Now back on room air with no wheezing on exam. 


-Treatment of individual issues below 





CAP


-WBC 18.5 but likely steroid induced


-Procalcitonin elevated at >2


-Repeat CXR does not show concern for persistent PNA


-Sputum cx: mod yeast 


-On PO levofloxacin, day 6 of 7 of abx





COPD with acute exacerbation


- Wheezing resolved, breath sounds improving


- C/w decreased dose of steroids, dc IV solumedrol, will now give pred 40 daily 

with plan for slow taper, c/w nebulizers ATC and PRN


- Recommend o/p testing 





Diabetes mellitus


-BS increased likely 2/2 to steroids 


-AM levemir 


-C/w consistent carb diet, SSI ACHS, hypoglycemic protocol





HFpEF with exacerbation 


-S/p back to back HD sessions, has HD today


-BNP >82K--> 75,700-->47K


-Echo above 


-Trop neg 


-C/w BB BID 





Physical deconditioning, acute on chronic 


-Pt has 24/7 care at home and will likely progress towards a home w/services 

plan.


-F/u PT/OT 





ESRD on HD


- Has HD today


- Follows [facility] schedule


- Nephrology following





Hyperkalemia 2/2 ESRD 


-c/w HD as scheduled. 


-Daily BMP 





Hyponatremia, chronic 


-Monitor 





Anemia of chronic disease 


-Stable 


-Aranesp with HD 





HTN


- continue home medications





Skin lesion on right temporal scalp


- pt has history of skin cancer with new lesion, needs to establish with 

outpatient local dermatologist for biopsy/excision


- referral at discharge





DVT prophylaxis: SC heparin





DISPOSITION: Inpatient status, nephrology following. PT/OT to see again on 

3/15/21. Plan is home with services.





VS,Fishbone, I+O


VS, Fishbone, I+O


Laboratory Tests


3/16/21 05:50











Vital Signs








  Date Time  Temp Pulse Resp B/P (MAP) Pulse Ox O2 Delivery O2 Flow Rate FiO2


 


3/16/21 06:00 97.1 72 17 125/61 (82) 95 Room Air  


 


3/15/21 14:13       1.0 


 


3/11/21 20:01        36














I&O- Last 24 Hours up to 6 AM 


 


 3/16/21





 06:00


 


Intake Total 1394 ml


 


Output Total 0 ml


 


Balance 1394 ml

















DENNIS ODELL MD   Mar 16, 2021 09:03

## 2021-03-16 NOTE — IPN
NEPHROLOGY PROGRESS NOTE



DATE:  03/16/2021



SUBJECTIVE: The patient is seen and examined at the bedside today morning

during hemodialysis procedure. He is tolerating the hemodialysis procedure

well. He denies any active complaints at this time.



OBJECTIVE:

VITAL SIGNS: Temperature 98 degrees Fahrenheit, blood pressure 104/52, pulse

70, respiratory rate 20, saturating 97% on room air.

INTAKE/OUTPUT: Urine output is not recorded. Weight in the bed scale is 84.7

kg. 



PHYSICAL EXAMINATION:

GENERAL: Patient is awake, alert, oriented x3, lying in bed getting

hemodialysis done. 

HEAD AND NECK: Extraocular muscles intact. Pupils equally round and reactive to

light. Mucous membranes are moist. Neck is supple. There is no JVD.

CARDIOVASCULAR: S1, S2, regular rate. 1+ edema of the bilateral lower

extremities. 

RESPIRATORY: Mildly decreased breath sounds at the bases. 

ABDOMEN: Soft, positive bowel sounds, nontender. No organomegaly.

MUSCULOSKELETAL: No clubbing or cyanosis. Pulses are 2+. 

CNS: No focal deficit. Power is 5/5 in bilateral upper extremities.



LABORATORY REVIEW: CBC showed WBC 21.5, hemoglobin 10, platelets 276,000. BMP

showed sodium 126, potassium 6.1, chloride 91, bicarb 23, , creatinine

7.2, calcium 8.2. 



CURRENT INPATIENT MEDICATIONS: Patient's medications were all reviewed by

myself. I.V. Solu-Medrol has been stopped. Patient has been started on

prednisone 40 mg p.o. daily.  



ASSESSMENT AND PLAN:

  1.  End-stage renal disease: Patient is being dialyzed. Ultrafiltration goal

      will be 3 liters as tolerated by his blood pressure.

  2.  Acute COPD exacerbation and community-acquired pneumonia: Patient's

      Solu-Medrol dose is being tapered down. He is currently on Levaquin.

  3.  Oropharyngeal Candidiasis and dysphagia: Patient is on Nystatin and

      Fluconazole. Symptoms are getting better. 

  4.  Anemia and end-stage renal disease: Continue Aranesp with dialysis.

  5.  Heart failure with preserved ejection fraction: Volume status is being

      optimized with dialysis. 

  6.  Hyperkalemia: Patient is being dialyzed with a 1K bath. Potassium should

      improve with that.

## 2021-03-17 VITALS — SYSTOLIC BLOOD PRESSURE: 99 MMHG | DIASTOLIC BLOOD PRESSURE: 52 MMHG

## 2021-03-17 VITALS — SYSTOLIC BLOOD PRESSURE: 143 MMHG | DIASTOLIC BLOOD PRESSURE: 52 MMHG

## 2021-03-17 VITALS — OXYGEN SATURATION: 98 %

## 2021-03-17 VITALS — DIASTOLIC BLOOD PRESSURE: 51 MMHG | SYSTOLIC BLOOD PRESSURE: 100 MMHG

## 2021-03-17 VITALS — DIASTOLIC BLOOD PRESSURE: 51 MMHG | SYSTOLIC BLOOD PRESSURE: 114 MMHG

## 2021-03-17 LAB
ALBUMIN SERPL BCG-MCNC: 2.7 GM/DL (ref 3.2–5.2)
ALT SERPL W P-5'-P-CCNC: 25 U/L (ref 12–78)
BILIRUB SERPL-MCNC: 0.5 MG/DL (ref 0.2–1)
BUN SERPL-MCNC: 55 MG/DL (ref 7–18)
CALCIUM SERPL-MCNC: 7.4 MG/DL (ref 8.8–10.2)
CHLORIDE SERPL-SCNC: 93 MEQ/L (ref 98–107)
CO2 SERPL-SCNC: 28 MEQ/L (ref 21–32)
CREAT SERPL-MCNC: 4.54 MG/DL (ref 0.7–1.3)
GFR SERPL CREATININE-BSD FRML MDRD: 13.3 ML/MIN/{1.73_M2} (ref 35–?)
GLUCOSE SERPL-MCNC: 235 MG/DL (ref 70–100)
HCT VFR BLD AUTO: 30.9 % (ref 42–52)
HGB BLD-MCNC: 10 G/DL (ref 13.5–17.5)
MCH RBC QN AUTO: 32.6 PG (ref 27–33)
MCHC RBC AUTO-ENTMCNC: 32.4 G/DL (ref 32–36.5)
MCV RBC AUTO: 100.7 FL (ref 80–96)
PLATELET # BLD AUTO: 210 10^3/UL (ref 150–450)
POTASSIUM SERPL-SCNC: 4.9 MEQ/L (ref 3.5–5.1)
PROT SERPL-MCNC: 6.2 GM/DL (ref 6.4–8.2)
RBC # BLD AUTO: 3.07 10^6/UL (ref 4.3–6.1)
SODIUM SERPL-SCNC: 131 MEQ/L (ref 136–145)
WBC # BLD AUTO: 14.2 10^3/UL (ref 4–10)

## 2021-03-17 RX ADMIN — NYSTATIN SCH ML: 100000 SUSPENSION ORAL at 08:44

## 2021-03-17 RX ADMIN — INSULIN LISPRO SCH UNITS: 100 INJECTION, SOLUTION INTRAVENOUS; SUBCUTANEOUS at 12:32

## 2021-03-17 RX ADMIN — IPRATROPIUM BROMIDE AND ALBUTEROL SULFATE SCH ML: .5; 3 SOLUTION RESPIRATORY (INHALATION) at 07:30

## 2021-03-17 RX ADMIN — FLUCONAZOLE SCH MG: 100 TABLET ORAL at 08:44

## 2021-03-17 RX ADMIN — SENNOSIDES SCH TAB: 8.6 TABLET, FILM COATED ORAL at 20:57

## 2021-03-17 RX ADMIN — ALUMINUM HYDROXIDE, MAGNESIUM HYDROXIDE, AND SIMETHICONE SCH ML: 200; 200; 20 SUSPENSION ORAL at 12:33

## 2021-03-17 RX ADMIN — ALUMINUM HYDROXIDE, MAGNESIUM HYDROXIDE, AND SIMETHICONE SCH ML: 200; 200; 20 SUSPENSION ORAL at 16:49

## 2021-03-17 RX ADMIN — IPRATROPIUM BROMIDE AND ALBUTEROL SULFATE SCH ML: .5; 3 SOLUTION RESPIRATORY (INHALATION) at 03:06

## 2021-03-17 RX ADMIN — OMEPRAZOLE SCH MG: 20 CAPSULE, DELAYED RELEASE ORAL at 08:43

## 2021-03-17 RX ADMIN — ACETAMINOPHEN PRN MG: 325 TABLET ORAL at 22:08

## 2021-03-17 RX ADMIN — INSULIN LISPRO SCH UNITS: 100 INJECTION, SOLUTION INTRAVENOUS; SUBCUTANEOUS at 08:43

## 2021-03-17 RX ADMIN — CALCIUM CARBONATE (ANTACID) CHEW TAB 500 MG PRN MG: 500 CHEW TAB at 21:12

## 2021-03-17 RX ADMIN — SODIUM CHLORIDE SCH UNITS: 4.5 INJECTION, SOLUTION INTRAVENOUS at 12:33

## 2021-03-17 RX ADMIN — IPRATROPIUM BROMIDE AND ALBUTEROL SULFATE SCH ML: .5; 3 SOLUTION RESPIRATORY (INHALATION) at 00:07

## 2021-03-17 RX ADMIN — CALCIUM CARBONATE (ANTACID) CHEW TAB 500 MG SCH MG: 500 CHEW TAB at 08:44

## 2021-03-17 RX ADMIN — DOCUSATE SODIUM SCH MG: 100 CAPSULE, LIQUID FILLED ORAL at 08:43

## 2021-03-17 RX ADMIN — ONDANSETRON HYDROCHLORIDE SCH MG: 4 TABLET, FILM COATED ORAL at 05:11

## 2021-03-17 RX ADMIN — SODIUM CHLORIDE SCH UNITS: 4.5 INJECTION, SOLUTION INTRAVENOUS at 05:12

## 2021-03-17 RX ADMIN — PANTOPRAZOLE SODIUM SCH MG: 40 TABLET, DELAYED RELEASE ORAL at 20:58

## 2021-03-17 RX ADMIN — ALUMINUM HYDROXIDE, MAGNESIUM HYDROXIDE, AND SIMETHICONE SCH ML: 200; 200; 20 SUSPENSION ORAL at 23:18

## 2021-03-17 RX ADMIN — ONDANSETRON HYDROCHLORIDE SCH MG: 4 TABLET, FILM COATED ORAL at 16:49

## 2021-03-17 RX ADMIN — ASPIRIN SCH MG: 81 TABLET ORAL at 08:44

## 2021-03-17 RX ADMIN — ONDANSETRON HYDROCHLORIDE SCH MG: 4 TABLET, FILM COATED ORAL at 12:33

## 2021-03-17 RX ADMIN — INSULIN LISPRO SCH UNITS: 100 INJECTION, SOLUTION INTRAVENOUS; SUBCUTANEOUS at 16:49

## 2021-03-17 RX ADMIN — ONDANSETRON HYDROCHLORIDE SCH MG: 4 TABLET, FILM COATED ORAL at 23:17

## 2021-03-17 RX ADMIN — SODIUM CHLORIDE SCH UNITS: 4.5 INJECTION, SOLUTION INTRAVENOUS at 20:59

## 2021-03-17 RX ADMIN — METOPROLOL TARTRATE SCH MG: 25 TABLET, FILM COATED ORAL at 20:58

## 2021-03-17 RX ADMIN — IPRATROPIUM BROMIDE AND ALBUTEROL SULFATE SCH ML: .5; 3 SOLUTION RESPIRATORY (INHALATION) at 20:24

## 2021-03-17 RX ADMIN — IPRATROPIUM BROMIDE AND ALBUTEROL SULFATE SCH ML: .5; 3 SOLUTION RESPIRATORY (INHALATION) at 23:54

## 2021-03-17 RX ADMIN — INSULIN LISPRO SCH UNITS: 100 INJECTION, SOLUTION INTRAVENOUS; SUBCUTANEOUS at 20:56

## 2021-03-17 RX ADMIN — INSULIN DETEMIR SCH UNITS: 100 INJECTION, SOLUTION SUBCUTANEOUS at 08:43

## 2021-03-17 RX ADMIN — METOPROLOL TARTRATE SCH MG: 25 TABLET, FILM COATED ORAL at 08:50

## 2021-03-17 RX ADMIN — IPRATROPIUM BROMIDE AND ALBUTEROL SULFATE SCH ML: .5; 3 SOLUTION RESPIRATORY (INHALATION) at 11:08

## 2021-03-17 RX ADMIN — IPRATROPIUM BROMIDE AND ALBUTEROL SULFATE SCH ML: .5; 3 SOLUTION RESPIRATORY (INHALATION) at 15:20

## 2021-03-17 RX ADMIN — INSULIN DETEMIR SCH UNITS: 100 INJECTION, SOLUTION SUBCUTANEOUS at 20:59

## 2021-03-17 RX ADMIN — ONDANSETRON HYDROCHLORIDE SCH MG: 4 TABLET, FILM COATED ORAL at 00:28

## 2021-03-17 RX ADMIN — DOCUSATE SODIUM SCH MG: 100 CAPSULE, LIQUID FILLED ORAL at 20:57

## 2021-03-17 NOTE — REP
INDICATION:

n/v. Nausea and vomiting.



COMPARISON:

None.



TECHNIQUE:

Single supine view of the abdomen.  KUB.



FINDINGS:

There are 4 metallic screws in the left acetabulum and severe osteoarthritis of the

left hip is noted.  Vascular calcification is seen.  There is a dextroconvex curve in

the lumbar spine.  There are calcifications in the upper abdomen, 1 on each side.

There is an old healed rib fracture on the right.



Bowel gas pattern is normal with air and stool in a nondistended colon.  No small

bowel dilation is seen.  There is a linear metallic density along the right flank of

uncertain significance.  Possible clothing artifact versus foreign body.



IMPRESSION:

No evidence of bowel obstruction or mass.  There are calcifications in the upper

abdomen bilaterally question urinary calculi.  There is a linear metallic density

along the right flank of uncertain significance, artifact versus foreign body.  Severe

osteoarthritis left hip.  Status post acetabular pinning.





<Electronically signed by Goran Toussaint > 03/17/21 [id number]

## 2021-03-17 NOTE — IPN
NEPHROLOGY PROGRESS NOTE



DATE:  03/17/2021



SUBJECTIVE: The patient was seen and examined at the bedside today morning. He

is afebrile, hemodynamically stable. He reports that his cough is better,

however, he is complaining of reflux symptoms. He was dialyzed yesterday and 3

liters of fluid was removed.



OBJECTIVE:

VITAL SIGNS: Temperature 97.3 degrees Fahrenheit, blood pressure 114/51, pulse

69, respiratory rate 18, saturating 99% on room air.

INTAKE/OUTPUT: Urine output is not recorded. Ultrafiltration with hemodialysis

was 3 liters. Weight in the bed scale is 82.7 kg. 



PHYSICAL EXAMINATION:

GENERAL: Patient is awake, alert, oriented x3, lying in bed in no apparent

distress. 

HEAD AND NECK: Extraocular muscles intact. Pupils equally round and reactive to

light. Mucous membranes are moist. Neck is supple. There is no JVD.

CARDIOVASCULAR: S1, S2, regular rate. No edema of the bilateral lower

extremities. 

RESPIRATORY: Chest is clear to auscultation bilaterally. Bilateral equal air

entry. No rales or rhonchi. 

ABDOMEN: Soft, positive bowel sounds, nontender. No organomegaly.

MUSCULOSKELETAL: No clubbing or cyanosis. Pulses are 2+. 

CNS: No focal deficit. Power is 5/5 in all extremities.



LABORATORY REVIEW: CBC showed WBC 14.2, hemoglobin 10, platelets 210,000. BMP

showed sodium 131, potassium 4.9, chloride 93, bicarb 28, BUN 55, creatinine

4.5. 



IMAGING: An abdominal x-ray was done today, which showed no evidence of bowel

obstruction or mass. 



CURRENT INPATIENT MEDICATIONS: Patient's medications were all reviewed by

myself. Tums has been stopped. Patient has been started on Mylanta. Nystatin

has also been stopped. Omeprazole has been stopped and he has been started on

Protonix 40 mg p.o. twice a day.



ASSESSMENT AND PLAN:

  1.  End-stage renal disease: Patient was dialyzed yesterday. Next

      hemodialysis will be done tomorrow morning. 

  2.  Acute COPD exacerbation and community-acquired pneumonia: Patient was on

      Levaquin, which has been stopped now. Solu-Medrol has been changed to

      prednisone. Leukocytosis is getting better.

  3.  Gastroesophageal reflux: Patient has been started on Mylanta. His PPI has

      also been changed to Protonix.

  4.  Anemia and end-stage renal disease: Continue Aranesp with dialysis. 

  5.  Heart failure with preserved ejection fraction: Volume status is

      optimized with dialysis, 3 liters were removed yesterday. Tomorrow I

      would try to remove at least 3.5 liters with dialysis.

## 2021-03-17 NOTE — DS.PDOC
Discharge Summary


General


Date of Admission


Mar 9, 2021 at 20:24


Date of Discharge


3/17/2021


Attending Physician:  DENNIS ODELL MD


Specialist/Consultants Involve:  PABLITO BOYER MD





Discharge Summary


PROCEDURES PERFORMED DURING STAY: None





ADMITTING DIAGNOSES: 


CAP


Hypoxia





DISCHARGE DIAGNOSES:


CAP


COPD exacerbation


HFpEF exacerbation


Oral candidiasis with probable esophagitis as well


CAD


ESRD on HD [facility]


Diabetes Mellitus


Hypertension


Chronic Atrial fibrillation 


Legally blind





COMPLICATIONS/CHIEF COMPLAINT: Hypoxia,Pneumonia.





HISTORY OF PRESENT ILLNESS: 


80 year old M with a history of CHF and ESRD on HD who presented with 1 week 

worsening upper respiratory symptoms. Patient states he started to feel 

congested with runny nose about 1 week prior and progressed to include a 

productive cough and shortness of breath for three days, with chills  and one 

fever up to 101.0F, but he is not sure if this was related to his recent Moderna

COVID-19 vaccine on 3/4/2021 and the fever occurred at some point after that. He

also noted some weight gain over the week prior. 





HOSPITAL COURSE:


On admission he was noted to have an unremarkable CXR with some probable 

atelectasis but did have an elevated procalcitonin and he had a full course of a

ntibiotics for CAP. He complained of dysphagia and odynophagia and was noted to 

have oral candidiasis and probable esophagitis and is on course of oral 

fluconazole and nystatin with interval improvement. He also had elevated proBNP 

and his volume status was managed by nephrology via HD. He was also treated with

a steroids for probable COPD exacerbation and will complete a prednisone taper 

upon discharge. He had hypoxemia at initial presentation that resolved with 

volume optimization via ultrafiltration, steroids and antibiotics. He is now 

being discharged home with family where he will continue to have 24 hour care 

and will follow up with his PCP within 7d and nephrology per HD schedule.  





DISCHARGE MEDICATIONS: Please see below.


 


ALLERGIES: Please see below.





PHYSICAL EXAMINATION ON DISCHARGE:


VITAL SIGNS: Please see below.


GENERAL: AAOx 3, resting comfortably in bed 


HEENT: Normocephalic, atraumatic, PERRLA, sclera anicteric, conjunctiva clear, 

NC in place 


NECK: Supple, trachea midline, no lymphadenopathy, no elevated JVD appreciated


CARDIOVASCULAR: Regular rate and rhythm, normal S1 and S2. No murmurs, rubs, or 

gallops


RESPIRATORY: no wheezing, decreased breath sounds b/l. No crackles, rhonchi.  


ABDOMEN: Soft, nontender, nondistended, bowel sounds present, no masses or hep

atosplenomegaly appreciated


EXTREMITIES: No cyanosis or edema. Pulses 2+/4 in bilateral upper and lower 

extremities. AV fistula LUE, thrill appreciated 


SKIN: Red raised lesions on the right frontal region of the scalp which is 

nontender, cool to touch, and without any surround erythema or drainage. Skin 

over bilateral shins is very dry and flaky.


NEUROLOGIC: Alert and oriented x3 to person, place and time. No focal deficits 

appreciated





LABORATORY DATA: Please see below.





IMAGING:





Echocardiogram: 


EF 65% 


Underlying atrial fibrillation/flutter with consistent ventricular paced 

rhythm.Paced QRS complexes with LBBB configuration. 


Somewhat technically challenging study in light of the patients body habitus, 

but diagnostically useful information was still obtained.


M-mode and two-dimensional echocardiography was performed with pulse, 

continuouswave, color flow, and tissue Doppler studies.  


Mild symmetrical left ventricular hypertrophy with septal wall motion 

abnormality due to right ventricle pacing, yet preserved global resting 

systolicfunction.


Moderately dilated left atrium with elevated estimated mean left atrial 

pressure.


At least mildly dilated right ventricle with normal wall motion. Doppler 

estimated pulmonary arterial pressure would be at least moderately severely 

increased.


Moderately dilated right atrium, but could not visualize his inferior vena cava 

to further estimate central venous pressure (we estimated his right ventricle 

systolic pressure using a standard of 10 mmHg for central venous pressure). 


Normal aortic dimensions.


Mild aortic valvular sclerosis with adequate cusp separation, but premature 

cuspclosure suggestive of a reduced forward stroke volume. No evidence of aortic

insufficiency. 


Moderate degenerative changes of the mitral valve apparatus with adequate 

leaflet excursion and no posterior systolic buckling. No inflow tract 

obstruction, but at least mild and possibly moderate insufficiency.


Normal appearing tricuspid valve with moderate insufficiency.


Pacing lead could be visualized traversing right heart structures, but no 

separate intracardiac mass. No pericardial effusion.





Admission CXR:


Evidence for prior sternotomy and CABG lung fields demonstrate chronic 

interstitial


changes.  Basilar atelectasis cannot be excluded.  Stent graft overlies the left

apex.





IMPRESSION:


Chronic changes.  Possible basilar atelectasis.





3/12/2021 CXR: stable as prior





PROGNOSIS: Good





ACTIVITY: As tolerated.





DIET: consistent carb, 2g sodium, 1.8L/24h fluid restriction





DISCHARGE PLAN: home with 24 hour care.





DISPOSITION: home with 24 hour care.





DISCHARGE INSTRUCTIONS:


Please complete the prednisone taper as prescribed. Please follow up with your 

PCP within 7d. Please continue HD schedule per nephrology.





ITEMS TO FOLLOWUP ON ON OUTPATIENT:


PCP follow up for recent CAP, oral candidiasis and esophagitis, COPD 

exacerbation


Nephrology for ESRD on HD





DISCHARGE CONDITION: Stable





TIME SPENT ON DISCHARGE: 56 minutes.





Vital Signs/I&Os





Vital Signs








  Date Time  Temp Pulse Resp B/P (MAP) Pulse Ox O2 Delivery O2 Flow Rate FiO2


 


3/17/21 08:50  70  100/51    


 


3/17/21 07:30   16     


 


3/17/21 06:00 98.1    100 Nasal Cannula 1.0 


 


3/11/21 20:01        36














I&O- Last 24 Hours up to 6 AM 


 


 3/17/21





 06:00


 


Intake Total 900 ml


 


Output Total 3000 ml


 


Balance -2100 ml











Laboratory Data


Labs 24H


Laboratory Tests 2


3/16/21 12:53: Bedside Glucose (Misc Panel) 146H


3/16/21 16:39: Bedside Glucose (Misc Panel) 214H


3/16/21 20:53: Bedside Glucose (Misc Panel) 108


3/17/21 08:12: 


Nucleated Red Blood Cells % (auto) 0.4H, Anion Gap 10, Glomerular Filtration 

Rate 13.3L, Calcium Level 7.4L, Total Bilirubin 0.5, Aspartate Amino Transf 

(AST/SGOT) 22, Alanine Aminotransferase (ALT/SGPT) 25, Alkaline Phosphatase 84, 

Total Protein 6.2L, Albumin 2.7L, Albumin/Globulin Ratio 0.8


3/17/21 08:39: Bedside Glucose (Misc Panel) 267H


CBC/BMP


Laboratory Tests


3/17/21 08:12








FSBS





Laboratory Tests








Test


 3/16/21


12:53 3/16/21


16:39 3/16/21


20:53 3/17/21


08:39 Range/Units


 


 


Bedside Glucose (Misc Panel) 146 214 108 267   MG/DL











Microbiology





Microbiology


3/10/21 Gram Stain - Final, Complete


          


3/10/21 Sputum Culture - Final, Complete


          Yeast Like Organism


3/9/21 Blood Culture - Final, Complete


         NO GROWTH AFTER 5 DAYS


3/9/21 Blood Culture - Final, Complete


         NO GROWTH AFTER 5 DAYS





Discharge Medications


Scheduled


Amlodipine Besylate (Amlodipine Besylate) 5 Mg Tablet, 2.5 MG PO QHS, (Reported)


Aspirin (Aspirin EC) 81 Mg Tablet.dr, 81 MG PO DAILY, (Reported)


Calcium Carbonate (Tums) 200 Mg Tab.chew, 500 MG PO PC, (Reported)


Cholecalciferol (Vitamin D3) (Vitamin D3) 125 Mcg Capsule, 125 MCG PO DAILY, 

(Reported)


Furosemide (Furosemide) 40 Mg Tablet, 40 MG PO BID, (Reported)


Metoprolol Tartrate (Metoprolol Tartrate) 25 Mg Tablet, 25 MG PO BID, (Reported)





Scheduled PRN


Acetaminophen (Acetaminophen) 500 Mg Tablet, 500 MG PO BID PRN for PAIN, 

(Reported)


Guaifen/Dextromethorphan/PE (Robitussin Cough-Cold Cf Liq) 118 Ml Liquid, 10 ML 

PO Q8H PRN for COUGH, (Reported)


Guaifenesin/Dextromethorphan (Mucinex Dm -30 mg Tablet) 1 Each Tab.er.12h,

1 TAB PO BID PRN for COUGH, (Reported)





Allergies


Coded Allergies:  


     No Known Allergies (Unverified , 9/17/20)











DENNIS ODELL MD   Mar 17, 2021 11:25

## 2021-03-17 NOTE — IPNPDOC
Text Note


Date of Service


The patient was seen on 3/17/21.





NOTE


SUBJECTIVE: 


-Was going to discharge him home this midmorning but close to discharge time he 

reported nausea, started dry heaving and reported heartburn like pain with any 

PO? This report is new to me. Got maalox and zofran. Will defer discharge.





OBJECTIVE: 





PHYSICAL EXAMINATION:


VITAL SIGNS: see below


GENERAL: AAOx 3, resting comfortably in bed 


HEENT: Normocephalic, atraumatic, PERRLA, sclera anicteric, conjunctiva clear, 

NC in place 


NECK: Supple, trachea midline, no lymphadenopathy, no elevated JVD appreciated


CARDIOVASCULAR: Regular rate and rhythm, normal S1 and S2. No murmurs, rubs, or 

gallops


RESPIRATORY: no wheezing, decreased breath sounds b/l. No crackles, rhonchi.  


ABDOMEN: Soft, nontender on examination, nondistended, bowel sounds present, no 

masses or hepatosplenomegaly appreciated


EXTREMITIES: No cyanosis or edema. Pulses 2+/4 in bilateral upper and lower 

extremities. AV fistula LUE, thrill appreciated 


SKIN: Red raised lesions on the right frontal region of the scalp which is 

nontender, cool to touch, and without any surround erythema or drainage. Skin 

over bilateral shins is very dry and flaky.


NEUROLOGIC: Alert and oriented x3 to person, place and time. No focal deficits 

appreciated





LABORATORY DATA: Reviewed





IMAGING:





Echocardiogram: 


EF 65% 


Underlying atrial fibrillation/flutter with consistent ventricular paced 

rhythm.Paced QRS complexes with LBBB configuration. 


Somewhat technically challenging study in light of the patients body habitus, 

but diagnostically useful information was still obtained.


M-mode and two-dimensional echocardiography was performed with pulse, 

continuouswave, color flow, and tissue Doppler studies.  


Mild symmetrical left ventricular hypertrophy with septal wall motion 

abnormality due to right ventricle pacing, yet preserved global resting 

systolicfunction.


Moderately dilated left atrium with elevated estimated mean left atrial 

pressure.


At least mildly dilated right ventricle with normal wall motion. Doppler 

estimated pulmonary arterial pressure would be at least moderately severely 

increased.


Moderately dilated right atrium, but could not visualize his inferior vena cava 

to further estimate central venous pressure (we estimated his right ventricle 

systolic pressure using a standard of 10 mmHg for central venous pressure). 


Normal aortic dimensions.


Mild aortic valvular sclerosis with adequate cusp separation, but premature 

cuspclosure suggestive of a reduced forward stroke volume. No evidence of aortic

insufficiency. 


Moderate degenerative changes of the mitral valve apparatus with adequate 

leaflet excursion and no posterior systolic buckling. No inflow tract 

obstruction, but at least mild and possibly moderate insufficiency.


Normal appearing tricuspid valve with moderate insufficiency.


Pacing lead could be visualized traversing right heart structures, but no 

separate intracardiac mass. No pericardial effusion.





CXR 3/12/21: 


Lower lobe airspace disease and small pleural reactions suggested.





CXR : 


Evidence for prior sternotomy and CABG lung fields demonstrate chronic in

terstitial changes. basilar atelectasis cannot be excluded.  Stent graft 

overlies the left apex.





MICROBIOLOGY: Please see below. 





ASSESSMENT: 80 year old male with PMHx of COPD with recurrent PNA, CAD s/p CABG,

ESRD on HD, HTN, diabetes mellitus, atriall fibrillation, pacemaker placement, 

and blindness, presents with 1 week history of worsening congestion, productive 

cough, chills, and shortness of breath concerning for pneumonia with 

exacerbation of COPD.





PLAN:





N/V with epigastric vs. esophageal burning pain


-maalox, zofran, KUB for N/V and acute abdominal pain


-continue fluconazole, nystatin for ongoing candidiasis/esophagitis


-day 7 of levaquin for CAP





Acute hypoxic respiratory failure likely multifactorial to community acquired 

pneumonia, COPD exacerbation and decompensated CHF: resolved


-Now back on room air with no wheezing on exam. 


-Treatment of individual issues below 





CAP


-WBC 18.5 but likely steroid induced


-Procalcitonin elevated at >2


-Repeat CXR does not show concern for persistent PNA


-Sputum cx: mod yeast 


-On PO levofloxacin, day 7 of 7 of abx





COPD with acute exacerbation


- Wheezing resolved, breath sounds improving


- C/w pred 40 daily with plan for taper, c/w nebulizers ATC and PRN


- Recommend o/p testing 





Diabetes mellitus


-BS increased likely 2/2 to steroids 


-AM levemir 


-C/w consistent carb diet, SSI ACHS, hypoglycemic protocol





HFpEF with exacerbation 


-S/p back to back HD sessions, has HD today


-Echo above 


-Trop neg 


-C/w BB BID 





Physical deconditioning, acute on chronic 


-Pt has 24/7 care at home and will likely progress towards a home w/services 

plan.


-F/u PT/OT 





ESRD on HD


- Has HD today


- Follows [facility] schedule


- Nephrology following





Hyperkalemia 2/2 ESRD: resolved


-c/w HD as scheduled. 


-Daily BMP 





Hyponatremia, chronic 


-Monitor 





Anemia of chronic disease 


-Stable 


-Aranesp with HD 





HTN


- continue home medications





Skin lesion on right temporal scalp


- pt has history of skin cancer with new lesion, needs to establish with 

outpatient local dermatologist for biopsy/excision


- referral at discharge





DVT prophylaxis: SC heparin





DISPOSITION: Inpatient status, nephrology following. PT/OT to see again on 

3/15/21. Plan is home with services when medically stable.





VS,Fishbone, I+O


VS, Fishbone, I+O


Laboratory Tests


3/17/21 08:12











Vital Signs








  Date Time  Temp Pulse Resp B/P (MAP) Pulse Ox O2 Delivery O2 Flow Rate FiO2


 


3/17/21 11:08   18     


 


3/17/21 08:50  70  100/51    


 


3/17/21 06:00 98.1    100 Nasal Cannula 1.0 


 


3/11/21 20:01        36














I&O- Last 24 Hours up to 6 AM 


 


 3/17/21





 06:00


 


Intake Total 900 ml


 


Output Total 3000 ml


 


Balance -2100 ml

















DENNIS ODELL MD   Mar 17, 2021 12:47

## 2021-03-18 VITALS — OXYGEN SATURATION: 98 %

## 2021-03-18 VITALS — DIASTOLIC BLOOD PRESSURE: 50 MMHG | SYSTOLIC BLOOD PRESSURE: 108 MMHG

## 2021-03-18 LAB
HCT VFR BLD AUTO: 32.6 % (ref 42–52)
HGB BLD-MCNC: 10.7 G/DL (ref 13.5–17.5)
MCH RBC QN AUTO: 32 PG (ref 27–33)
MCHC RBC AUTO-ENTMCNC: 32.8 G/DL (ref 32–36.5)
MCV RBC AUTO: 97.6 FL (ref 80–96)
PLATELET # BLD AUTO: 238 10^3/UL (ref 150–450)
RBC # BLD AUTO: 3.34 10^6/UL (ref 4.3–6.1)
WBC # BLD AUTO: 16.6 10^3/UL (ref 4–10)

## 2021-03-18 RX ADMIN — INSULIN LISPRO SCH UNITS: 100 INJECTION, SOLUTION INTRAVENOUS; SUBCUTANEOUS at 12:00

## 2021-03-18 RX ADMIN — SODIUM CHLORIDE SCH UNITS: 4.5 INJECTION, SOLUTION INTRAVENOUS at 05:29

## 2021-03-18 RX ADMIN — FLUCONAZOLE SCH MG: 100 TABLET ORAL at 05:29

## 2021-03-18 RX ADMIN — ONDANSETRON HYDROCHLORIDE SCH MG: 4 TABLET, FILM COATED ORAL at 13:13

## 2021-03-18 RX ADMIN — PANTOPRAZOLE SODIUM SCH MG: 40 TABLET, DELAYED RELEASE ORAL at 05:28

## 2021-03-18 RX ADMIN — INSULIN LISPRO SCH UNITS: 100 INJECTION, SOLUTION INTRAVENOUS; SUBCUTANEOUS at 06:49

## 2021-03-18 RX ADMIN — IPRATROPIUM BROMIDE AND ALBUTEROL SULFATE SCH ML: .5; 3 SOLUTION RESPIRATORY (INHALATION) at 12:00

## 2021-03-18 RX ADMIN — ALUMINUM HYDROXIDE, MAGNESIUM HYDROXIDE, AND SIMETHICONE SCH ML: 200; 200; 20 SUSPENSION ORAL at 05:28

## 2021-03-18 RX ADMIN — DOCUSATE SODIUM SCH MG: 100 CAPSULE, LIQUID FILLED ORAL at 05:28

## 2021-03-18 RX ADMIN — ASPIRIN SCH MG: 81 TABLET ORAL at 05:29

## 2021-03-18 RX ADMIN — IPRATROPIUM BROMIDE AND ALBUTEROL SULFATE SCH ML: .5; 3 SOLUTION RESPIRATORY (INHALATION) at 03:27

## 2021-03-18 RX ADMIN — DARBEPOETIN ALFA SCH MCG: 100 INJECTION, SOLUTION INTRAVENOUS; SUBCUTANEOUS at 10:08

## 2021-03-18 RX ADMIN — SODIUM CHLORIDE SCH UNITS: 4.5 INJECTION, SOLUTION INTRAVENOUS at 14:00

## 2021-03-18 RX ADMIN — ONDANSETRON HYDROCHLORIDE SCH MG: 4 TABLET, FILM COATED ORAL at 05:29

## 2021-03-18 RX ADMIN — ALUMINUM HYDROXIDE, MAGNESIUM HYDROXIDE, AND SIMETHICONE SCH ML: 200; 200; 20 SUSPENSION ORAL at 13:13

## 2021-03-18 RX ADMIN — INSULIN DETEMIR SCH UNITS: 100 INJECTION, SOLUTION SUBCUTANEOUS at 06:50

## 2021-03-18 RX ADMIN — METOPROLOL TARTRATE SCH MG: 25 TABLET, FILM COATED ORAL at 08:38

## 2021-03-18 RX ADMIN — IPRATROPIUM BROMIDE AND ALBUTEROL SULFATE SCH ML: .5; 3 SOLUTION RESPIRATORY (INHALATION) at 08:03

## 2021-03-18 NOTE — IPN
NEPHROLOGY PROGRESS NOTE



DATE:  03/18/2021



SUBJECTIVE: The patient was seen and examined at the bedside today morning

during hemodialysis procedure. He is tolerating the hemodialysis procedure

well. He reports mild improvement in the reflux symptoms and dysphagia. He

denies any cough at this time. 



OBJECTIVE: 

VITAL SIGNS: Temperature is 98.2 degrees Fahrenheit, blood pressure is 108/50,

pulse is 70, respiratory rate of 18, saturating 99% on nasal cannula. 

INTAKE AND OUTPUT: There is no urine output recorded. 

Weight in the bed scale is 82.8 kg.



PHYSICAL EXAMINATION: 

GENERAL APPEARANCE: The patient is awake, alert, oriented x3, laying in bed in

no apparent distress. 

HEAD AND NECK:  Extraocular muscles intact. Pupils are equally round and

reactive to light. Mucous membranes are moist. Neck is supple. There is no

jugular venous distention. 

CARDIOVASCULAR: S1, S2, regular rate.

EXTREMITIES:  No edema of the bilateral lower extremities. 

RESPIRATORY: Mildly decreased breath sounds at the bases, otherwise no active

rales or rhonchi.  

ABDOMEN: Soft, positive bowel sounds, nontender, no organomegaly. 

MUSCULOSKELETAL: No clubbing, no cyanosis. Pulses are 2+. 

CNS: No focal deficits. Power is 5/5 in all extremities. 



LAB REVIEW: CBC showed a WBC of 16.6, hemoglobin is 10.7, platelet count 238.



BMP showed sodium of 131, potassium 4.9 and this lab is from yesterday. 



CURRENT INPATIENT MEDICATIONS: The patient's medications were all reviewed by

myself. There is no significant change in the medications today as compared

with yesterday. 



ASSESSMENT AND PLAN:

1.  End-stage renal disease - The patient is being dialyzed according to his

    regular schedule. Ultrafiltration goal is around 2-3 liters as tolerated by

    his blood pressure. 

2.  Acute COPD exacerbation and community acquired pneumonia - The patient was

    given Levaquin.  Clinically he is getting better. He is currently on

    Prednisone. 



3.  Gastroesophageal reflux disease and possible oropharyngeal candidiasis -

    The patient is on Fluconazole. He was also given Mylanta and Protonix.

    Symptoms are getting better.



4.  Anemia and end-stage renal disease - continue Aranesp. The rest of the

    anemia management will be done as an outpatient. 



5.  Congestive heart failure with preserved ejection fraction  2 to 3 liters of

    fluid will be removed. Volume status is significantly better.

## 2021-03-18 NOTE — IPNPDOC
Text Note


Date of Service


The patient was seen on 3/18/21.





NOTE


SUBJECTIVE: 


-Reflux and heartburn has improved, tolerated food well. Will discharge home on 

protonix 40 BID. Likely exacerbation of GERD and heartburn is 2/2 steroid 

therapy. 





OBJECTIVE: 





PHYSICAL EXAMINATION:


VITAL SIGNS: see below


GENERAL: AAOx 3, resting comfortably in bed 


HEENT: Normocephalic, atraumatic, PERRLA, sclera anicteric, conjunctiva clear, 

NC in place 


NECK: Supple, trachea midline, no lymphadenopathy, no elevated JVD appreciated


CARDIOVASCULAR: Regular rate and rhythm, normal S1 and S2. No murmurs, rubs, or 

gallops


RESPIRATORY: no wheezing, decreased breath sounds b/l. No crackles, rhonchi.  


ABDOMEN: Soft, nontender on examination, nondistended, bowel sounds present, no 

masses or hepatosplenomegaly appreciated


EXTREMITIES: No cyanosis or edema. Pulses 2+/4 in bilateral upper and lower 

extremities. AV fistula LUE, thrill appreciated 


SKIN: Red raised lesions on the right frontal region of the scalp which is 

nontender, cool to touch, and without any surround erythema or drainage. Skin 

over bilateral shins is very dry and flaky.


NEUROLOGIC: Alert and oriented x3 to person, place and time. No focal deficits 

appreciated





LABORATORY DATA: Reviewed





IMAGING:





Echocardiogram: 


EF 65% 


Underlying atrial fibrillation/flutter with consistent ventricular paced 

rhythm.Paced QRS complexes with LBBB configuration. 


Somewhat technically challenging study in light of the patients body habitus, 

but diagnostically useful information was still obtained.


M-mode and two-dimensional echocardiography was performed with pulse, 

continuouswave, color flow, and tissue Doppler studies.  


Mild symmetrical left ventricular hypertrophy with septal wall motion 

abnormality due to right ventricle pacing, yet preserved global resting 

systolicfunction.


Moderately dilated left atrium with elevated estimated mean left atrial 

pressure.


At least mildly dilated right ventricle with normal wall motion. Doppler est

imated pulmonary arterial pressure would be at least moderately severely 

increased.


Moderately dilated right atrium, but could not visualize his inferior vena cava 

to further estimate central venous pressure (we estimated his right ventricle 

systolic pressure using a standard of 10 mmHg for central venous pressure). 


Normal aortic dimensions.


Mild aortic valvular sclerosis with adequate cusp separation, but premature 

cuspclosure suggestive of a reduced forward stroke volume. No evidence of aortic

insufficiency. 


Moderate degenerative changes of the mitral valve apparatus with adequate 

leaflet excursion and no posterior systolic buckling. No inflow tract 

obstruction, but at least mild and possibly moderate insufficiency.


Normal appearing tricuspid valve with moderate insufficiency.


Pacing lead could be visualized traversing right heart structures, but no 

separate intracardiac mass. No pericardial effusion.





CXR 3/12/21: 


Lower lobe airspace disease and small pleural reactions suggested.





CXR : 


Evidence for prior sternotomy and CABG lung fields demonstrate chronic 

interstitial changes. basilar atelectasis cannot be excluded.  Stent graft 

overlies the left apex.





MICROBIOLOGY: Please see below. 





ASSESSMENT: 80 year old male with PMHx of COPD with recurrent PNA, CAD s/p CABG,

ESRD on HD, HTN, diabetes mellitus, atriall fibrillation, pacemaker placement, 

and blindness, presents with 1 week history of worsening congestion, productive 

cough, chills, and shortness of breath concerning for pneumonia with 

exacerbation of COPD.





PLAN:





N/V with epigastric vs. esophageal burning pain


-maalox, zofran, KUB was negative for obstructive pathology


-continue fluconazole, nystatin for ongoing candidiasis/esophagitis


-s/p 7d of levaquin for CAP


-switched PPI to protonix 40 BID from daily omeprazole i/s/o GERD exacerbation 

i/s/o steroid therapy





Acute hypoxic respiratory failure likely multifactorial to community acquired 

pneumonia, COPD exacerbation and decompensated CHF: resolved


-Now back on room air with no wheezing on exam. 


-Treatment of individual issues below 





CAP


-WBC 18.5 but likely steroid induced


-Procalcitonin elevated at >2


-Repeat CXR does not show concern for persistent PNA


-Sputum cx: mod yeast 


-On PO levofloxacin, day 7 of 7 of abx





COPD with acute exacerbation


- Wheezing resolved, breath sounds improving


- C/w pred 40 daily with plan for taper, c/w nebulizers ATC and PRN


- Recommend o/p testing 





Diabetes mellitus


-BS increased likely 2/2 to steroids 


-AM levemir 


-C/w consistent carb diet, SSI ACHS, hypoglycemic protocol





HFpEF with exacerbation 


-S/p back to back HD sessions, has HD today


-Echo above 


-Trop neg 


-C/w BB BID 





Physical deconditioning, acute on chronic 


-Pt has 24/7 care at home and will likely progress towards a home w/services 

plan.


-F/u PT/OT 





ESRD on HD


- Has HD today


- Follows [facility] schedule


- Nephrology following





Hyperkalemia 2/2 ESRD: resolved


-c/w HD as scheduled. 


-Daily BMP 





Hyponatremia, chronic 


-Monitor 





Anemia of chronic disease 


-Stable 


-Aranesp with HD 





HTN


- continue home medications





Skin lesion on right temporal scalp


- pt has history of skin cancer with new lesion, needs to establish with 

outpatient local dermatologist for biopsy/excision


- referral at discharge





DVT prophylaxis: SC heparin





DISPOSITION: DC home after HD today





VS,Fishbone, I+O


VS, Fishbone, I+O





Vital Signs








  Date Time  Temp Pulse Resp B/P (MAP) Pulse Ox O2 Delivery O2 Flow Rate FiO2


 


3/18/21 06:00 98.2 70 18 108/50 (69) 99 Nasal Cannula 1.0 














I&O- Last 24 Hours up to 6 AM 


 


 3/18/21





 06:00


 


Intake Total 660 ml


 


Output Total 0 ml


 


Balance 660 ml

















DENNIS ODELL MD   Mar 18, 2021 10:22

## 2021-03-22 ENCOUNTER — HOSPITAL ENCOUNTER (INPATIENT)
Dept: HOSPITAL 53 - M ED | Age: 81
LOS: 1 days | Discharge: ANOTHER HOSPITAL | DRG: 377 | End: 2021-03-23
Attending: INTERNAL MEDICINE | Admitting: INTERNAL MEDICINE
Payer: COMMERCIAL

## 2021-03-22 VITALS — SYSTOLIC BLOOD PRESSURE: 82 MMHG

## 2021-03-22 VITALS — SYSTOLIC BLOOD PRESSURE: 75 MMHG | DIASTOLIC BLOOD PRESSURE: 45 MMHG

## 2021-03-22 VITALS — OXYGEN SATURATION: 98 %

## 2021-03-22 VITALS — SYSTOLIC BLOOD PRESSURE: 68 MMHG

## 2021-03-22 VITALS — HEIGHT: 68 IN | WEIGHT: 190.7 LBS | BODY MASS INDEX: 28.9 KG/M2

## 2021-03-22 VITALS — SYSTOLIC BLOOD PRESSURE: 98 MMHG

## 2021-03-22 VITALS — OXYGEN SATURATION: 99 %

## 2021-03-22 VITALS — SYSTOLIC BLOOD PRESSURE: 100 MMHG | OXYGEN SATURATION: 100 %

## 2021-03-22 DIAGNOSIS — E11.51: ICD-10-CM

## 2021-03-22 DIAGNOSIS — Z79.899: ICD-10-CM

## 2021-03-22 DIAGNOSIS — I25.10: ICD-10-CM

## 2021-03-22 DIAGNOSIS — K21.00: ICD-10-CM

## 2021-03-22 DIAGNOSIS — Z90.49: ICD-10-CM

## 2021-03-22 DIAGNOSIS — J44.9: ICD-10-CM

## 2021-03-22 DIAGNOSIS — Z85.828: ICD-10-CM

## 2021-03-22 DIAGNOSIS — N25.81: ICD-10-CM

## 2021-03-22 DIAGNOSIS — E87.5: ICD-10-CM

## 2021-03-22 DIAGNOSIS — Z66: ICD-10-CM

## 2021-03-22 DIAGNOSIS — I95.89: ICD-10-CM

## 2021-03-22 DIAGNOSIS — K55.1: ICD-10-CM

## 2021-03-22 DIAGNOSIS — I95.3: ICD-10-CM

## 2021-03-22 DIAGNOSIS — E87.2: ICD-10-CM

## 2021-03-22 DIAGNOSIS — I48.91: ICD-10-CM

## 2021-03-22 DIAGNOSIS — B37.0: ICD-10-CM

## 2021-03-22 DIAGNOSIS — Z87.891: ICD-10-CM

## 2021-03-22 DIAGNOSIS — L97.429: ICD-10-CM

## 2021-03-22 DIAGNOSIS — D63.1: ICD-10-CM

## 2021-03-22 DIAGNOSIS — Z95.0: ICD-10-CM

## 2021-03-22 DIAGNOSIS — D62: ICD-10-CM

## 2021-03-22 DIAGNOSIS — I12.0: ICD-10-CM

## 2021-03-22 DIAGNOSIS — E11.622: ICD-10-CM

## 2021-03-22 DIAGNOSIS — Z79.52: ICD-10-CM

## 2021-03-22 DIAGNOSIS — Z99.2: ICD-10-CM

## 2021-03-22 DIAGNOSIS — N18.6: ICD-10-CM

## 2021-03-22 DIAGNOSIS — R57.8: ICD-10-CM

## 2021-03-22 DIAGNOSIS — L97.529: ICD-10-CM

## 2021-03-22 DIAGNOSIS — E11.22: ICD-10-CM

## 2021-03-22 DIAGNOSIS — K62.5: Primary | ICD-10-CM

## 2021-03-22 DIAGNOSIS — Z95.1: ICD-10-CM

## 2021-03-22 DIAGNOSIS — Z79.82: ICD-10-CM

## 2021-03-22 DIAGNOSIS — H54.8: ICD-10-CM

## 2021-03-22 LAB
ALBUMIN SERPL BCG-MCNC: 2.6 GM/DL (ref 3.2–5.2)
ALT SERPL W P-5'-P-CCNC: 18 U/L (ref 12–78)
APTT BLD: 24.7 SECONDS (ref 24.2–38.5)
BASE EXCESS BLDV CALC-SCNC: 0.8 MMOL/L (ref -2–2)
BASOPHILS # BLD AUTO: 0 10^3/UL (ref 0–0.2)
BASOPHILS NFR BLD AUTO: 0.2 % (ref 0–1)
BILIRUB CONJ SERPL-MCNC: 0.1 MG/DL (ref 0–0.2)
BILIRUB SERPL-MCNC: 0.3 MG/DL (ref 0.2–1)
C DIFF TOX GENS STL QL NAA+PROBE: NEGATIVE
CK MB CFR.DF SERPL CALC: 7.86
CK MB SERPL-MCNC: 3.3 NG/ML (ref ?–3.6)
CK SERPL-CCNC: 42 U/L (ref 39–308)
CO2 BLDV CALC-SCNC: 29.3 MEQ/L (ref 24–28)
EOSINOPHIL # BLD AUTO: 0 10^3/UL (ref 0–0.5)
EOSINOPHIL NFR BLD AUTO: 0.1 % (ref 0–3)
FERRITIN SERPL-MCNC: 1360 NG/ML (ref 26–388)
FOLATE SERPL-MCNC: 4.1 NG/ML (ref 5.4–?)
HCO3 BLDV-SCNC: 27.6 MEQ/L (ref 23–27)
HCO3 STD BLDV-SCNC: 24.7 MEQ/L
HCT VFR BLD AUTO: 20.8 % (ref 42–52)
HCT VFR BLD AUTO: 27.9 % (ref 42–52)
HCT VFR BLD AUTO: 29.2 % (ref 42–52)
HGB BLD-MCNC: 6.2 G/DL (ref 13.5–17.5)
HGB BLD-MCNC: 8.8 G/DL (ref 13.5–17.5)
HGB BLD-MCNC: 9.2 G/DL (ref 13.5–17.5)
INR PPP: 1.19
IRON SATN MFR SERPL: 64.2 % (ref 19.7–50)
IRON SERPL-MCNC: 68 UG/DL (ref 65–175)
LIPASE SERPL-CCNC: 156 U/L (ref 73–393)
LYMPHOCYTES # BLD AUTO: 0.7 10^3/UL (ref 1.5–5)
LYMPHOCYTES NFR BLD AUTO: 3.6 % (ref 24–44)
MCH RBC QN AUTO: 33.3 PG (ref 27–33)
MCHC RBC AUTO-ENTMCNC: 31.5 G/DL (ref 32–36.5)
MCV RBC AUTO: 105.7 FL (ref 80–96)
MONOCYTES # BLD AUTO: 0.3 10^3/UL (ref 0–0.8)
MONOCYTES NFR BLD AUTO: 1.6 % (ref 2–8)
NEUTROPHILS # BLD AUTO: 18.8 10^3/UL (ref 1.5–8.5)
NEUTROPHILS NFR BLD AUTO: 92.1 % (ref 36–66)
PCO2 BLDV: 55.4 MMHG (ref 38–50)
PH BLDV: 7.32 UNITS (ref 7.33–7.43)
PLATELET # BLD AUTO: 165 10^3/UL (ref 150–450)
PO2 BLDV: 38.7 MMHG (ref 30–50)
PROT SERPL-MCNC: 5.5 GM/DL (ref 6.4–8.2)
PROTHROMBIN TIME: 15.4 SECONDS (ref 12.5–14.3)
RBC # BLD AUTO: 2.64 10^6/UL (ref 4.3–6.1)
SAO2 % BLDV: 67.4 % (ref 60–80)
TIBC SERPL-MCNC: 106 UG/DL (ref 250–450)
TROPONIN I SERPL-MCNC: 0.04 NG/ML (ref ?–0.1)
VIT B12 SERPL-MCNC: 454 PG/ML (ref 247–911)
WBC # BLD AUTO: 20.4 10^3/UL (ref 4–10)

## 2021-03-22 PROCEDURE — 30233N1 TRANSFUSION OF NONAUTOLOGOUS RED BLOOD CELLS INTO PERIPHERAL VEIN, PERCUTANEOUS APPROACH: ICD-10-PCS | Performed by: INTERNAL MEDICINE

## 2021-03-22 RX ADMIN — NYSTATIN SCH ML: 100000 SUSPENSION ORAL at 21:09

## 2021-03-22 RX ADMIN — CALCIUM CARBONATE (ANTACID) CHEW TAB 500 MG SCH MG: 500 CHEW TAB at 18:01

## 2021-03-22 RX ADMIN — SODIUM CHLORIDE, PRESERVATIVE FREE SCH ML: 5 INJECTION INTRAVENOUS at 21:10

## 2021-03-22 RX ADMIN — DEXTROSE MONOHYDRATE SCH MG: 50 INJECTION, SOLUTION INTRAVENOUS at 21:09

## 2021-03-22 RX ADMIN — NYSTATIN SCH ML: 100000 SUSPENSION ORAL at 18:01

## 2021-03-22 NOTE — IPNPDOC
Text Note


Date of Service


The patient was seen on 3/22/21.





NOTE


time of service 805 pm





I was informed by the patient's RNs that the patient had a near syncopal event 

followed by passing more than 500ml of maroon colored stool.


His SBP was about 100 while his HR was 82. 


There was no change in the patient's mental status.





#Near syncope likely vasovagal


Plan: change activity to bed rest / fall precautions / check orthostats x 1 / 

place 2 large bore IVs / start NS @ 40ml/H / hold his BP meds / repeat Hg & 

lactic acid now / check iron studies and soluble transferin receptor /  if he 

continues to bleed or his vitals change we will consult Gen Surg tonight to 

discuss urgent endoscopy





VS,Ale, I+O


VS, Ale, I+O


Laboratory Tests


3/22/21 11:23








3/22/21 18:05











Vital Signs








  Date Time  Temp Pulse Resp B/P (MAP) Pulse Ox O2 Delivery O2 Flow Rate FiO2


 


3/22/21 16:09 96.6 69 16 133/58 (83) 97   

















TATIANA DIAZ MD                Mar 22, 2021 20:11

## 2021-03-22 NOTE — REP
INDICATION:

r/o ischemic colitis



COMPARISON:

None.



TECHNIQUE:

CT angiogram of the abdomen and pelvis was performed with intravenous administration

of 100 cc of Isovue 370, without oral contrast. 3D MIP reconstruction images performed.



FINDINGS:

Abdominal aorta: No aneurysm or dissection.  There are moderate diffuse

atherosclerotic calcifications of the abdominal aorta and its branches.  There is mild

narrowing at the origin of the celiac artery.  There is high-grade severe stenosis at

the origin of the superior mesenteric artery, with stenosis at least 90%.  There is

greater than 50% stenosis at the origin of the inferior mesenteric artery.  There is

high-grade stenosis at the origin of the main right renal artery.  There is moderate

stenosis at the origin of the main left renal artery.  I do not see definite stenosis

of the bilateral common iliac or external iliac arteries.  I do not see definite

stenosis of the common femoral arteries.  There is high-grade stenosis of the proximal

left superficial femoral artery.  There appears to be moderate stenosis of the

proximal right SFA.



Lung bases: There is mild patchy parenchymal opacity in the dependent portion of each

lung base representing mild atelectasis and or infiltrate..

Liver:  Normal

Gallbladder:  Unremarkable.

Spleen:  Normal.

Adrenals:  Normal.

Pancreas:  Normal.

Kidneys:  There is severe bilateral renal atrophy without hydronephrosis.

Small and large bowel: Unremarkable.

Free fluid:  None.



Adenopathy:  None.



Pelvis:  No mass.  There is a left inguinal hernia containing noninflamed fat.



Osseous structures:  Unremarkable.



IMPRESSION:

Diffuse atherosclerotic calcifications of the abdominal aorta and its branches.  Mild

narrowing origin of celiac artery.  High-grade severe stenosis at the origin of the

superior mesenteric artery, with stenosis at least 90%.  There is greater than 50%

stenosis at the origin of the inferior mesenteric artery.



High-grade stenosis proximal left SFA, moderate stenosis proximal right SFA.



Mild patchy atelectasis and or infiltrate bilateral lung bases.





<Electronically signed by Pravin Avelar > 03/22/21 [id number]

## 2021-03-22 NOTE — HPEPDOC
General


Date of Admission





Date of Service:  Mar 22, 2021


Attending Physician:  DEO LEGGETT MD


Chief Complaint


The patient is a 80-year-old male admitted with a reason for visit of Rectal 

Bleeding.


Source:  Patient


Associated Symptoms:  Nausea





History of Present Illness


HPI:


Pt is a 80 year old male with PMH of ESRD on dialysis, DM, HTN, atrial 

fibrillation, COPD, CAD s/p CABG who presents to the ED with c/o rectal bleeding

and lightheadedness onset this morning. Pt states that he was getting up from 

the toilet this morning when he felt lightheaded and slid off the toilet onto 

the ground. Denies actual fall or hitting his head. Pt is legally blind and 

lives with his daughter who takes care of him. States that his daughter noted 

bright red blood in the toilet at that time. Admits to diarrhea with loose stool

s 3-4x a day since discharge from hospital on 3/18/21 for CAP. Admits to nausea 

and abd pain. He states that he has had similar sx 7 years ago at which time he 

had a colonoscopy done. Pt had 2 polyps removed at the time of colonoscopy but 

pt reports that it was otherwise nl. He states that his blood thinners and 

glipizide was discontinued at that time. Denies hx of hemorrhoids. He states 

that he receives dialysis on Tue, Thur, and Sat. His last dialysis session was 

on Saturday and he states that he makes "a little bit of urine". 


Pt was admitted and discharged from here on 3/18/21 after treatment for CAP with

abx and prednisone. His discharge at that time was delayed by a day due to 

severe heartburn that was relieved with PPI.





Home Medications


Scheduled


Pantoprazole Sodium (Pantoprazole Sodium) 40 Mg Vial, 40 MG IV Q12H





Allergies


Coded Allergies:  


     No Known Allergies (Unverified , 3/22/21)





Past Medical History


Medical History


1. ESRD on dialysis TThS. 


2. CAD s/p CABG. 


3. DM. 


4. HTN.


5. Atrial fibrillation now s/p pacemaker. 


6. COPD. 


7. Legally blind. 


8. Skin cancer s/p resection.


Surgical History


1. Cholecystectomy.


2. CABG.


3. Skin cancer removal on face.





Social History


* Smoker:  former Smoker, quit greater than 1 year (Quit smoking 40 years ago. 

Smoked 4 PPD for 5 years (20 pack year hx))


Alcohol:  other (Previous heavy alcohol use. Used to drink 7-8 drinks/day. Last 

had one drink on LEIDY. )


Drugs:  denies


Pets in the home:  Dog(s)


Pt is retired. Used to work as a . 


Currently lives with his daughter and dogs.





A-FIB/CHADSVASC


A-FIB History


Current/History of A-Fib/PAF?:  Yes


Current PO Anticoag Therapy:  No (Pt has hx of GI bleed 7 years ago, at which 

time blood thinners were discontinued. )





Review of Systems


Constitutional:  Denies: Chills, Fever


ENT:  Denies: Head Aches


Pulmonary:  Denies: Dyspnea, Cough, Pleuritic Chest Pain


Cardiovascular:  Reports: Lt Headedness; 


   Denies: Chest Pain


Gastrointestinal:  Reports: Nausea, Abdominal Pain, Diarrhea, Other Symptoms 

(Rectal bleeding); 


   Denies: Vomiting, Constipation


Hematologic:  Reports: Bruising





Physical Examination


General Exam:  Positive: Alert, Cooperative, No Acute Distress


Eye Exam:  Positive: Conjunctiva & lids normal, EOMI; 


   Negative: Sclera icteric


ENT Exam:  Positive: Atraumatic


Chest Exam:  Positive: Clear to auscultation, Normal air movement; 


   Negative: Rales, Rhonchi, Wheezing


Heart Exam:  Positive: Rate Normal, Regular Rhythm, Normal S1, Normal S2; 


   Negative: Gallops, Murmurs, Rubs


Abdomen Exam:  Positive: BS Hyperactive, Soft, Tenderness (Tenderness to all 

four quadrants, L>R.), Other (Multiple areas of bruising that pt reports have 

been there since his last admission here.); 


   Negative: Hepatospenomegaly


Extremity Exam:  Positive: Normal pulses; 


   Negative: Edema


Skin Exam:  Positive: Other skin issue (Ulcers to bilateral heels and lateral 

aspect of L great toe. Clean dressings in place. Does not appear cellulitic. )


Neuro Exam:  Positive: Normal Speech, Cranial Nerves 3-12 NL


Psych Exam:  Positive: Mental status NL, Mood NL





Vital Signs





Vital Signs








  Date Time  Temp Pulse Resp B/P (MAP) Pulse Ox O2 Delivery O2 Flow Rate FiO2


 


3/22/21 10:32 96.6 69 16 143/64 (90) 99   











Laboratory Data


Labs 24H


Laboratory Tests 2


3/22/21 11:23: 


Immature Granulocyte % (Auto) 2.4, Neutrophils (%) (Auto) 92.1H, Lymphocytes (%)

(Auto) 3.6L, Monocytes (%) (Auto) 1.6L, Eosinophils (%) (Auto) 0.1, Basophils 

(%) (Auto) 0.2, Neutrophils # (Auto) 18.8H, Lymphocytes # (Auto) 0.7L, Monocytes

# (Auto) 0.3, Eosinophils # (Auto) 0.0, Basophils # (Auto) 0.0, Nucleated Red 

Blood Cells % (auto) 0.1H, Prothrombin Time 15.4H, Prothromb Time International 

Ratio 1.19, Activated Partial Thromboplast Time 24.7L, Lactic Acid Level 2.1*H, 

Total Bilirubin 0.3, Direct Bilirubin 0.1, Aspartate Amino Transf (AST/SGOT) 12,

Alanine Aminotransferase (ALT/SGPT) 18, Alkaline Phosphatase 115, Total Creatine

Kinase 42, Creatine Kinase MB 3.3, Creatine Kinase MB Relative Index 7.86H, 

Troponin I 0.04, Total Protein 5.5L, Albumin 2.6L, Albumin/Globulin Ratio 0.9, 

Lipase 156


3/22/21 11:47: 


POC Glucose (Misc Panel) 225H, POC Sodium (Misc Panel) 133L, POC Potassium (Misc

Panel) 5.1, POC Chloride (Misc Panel) 95L, POC Total CO2 (Misc Panel) 30.0H, POC

Blood Urea Nitrogen (Misc Panel 95H, POC Ionized Calcium (Misc Panel) 4.1L, POC 

Creatinine (Misc Panel) 7.3H, POC Hematocrit (Misc Panel) 28.0L, Blood Gas 

Bicarbonate Standard 24.7, Venous Blood pH 7.315L, Venous Blood Partial Pressure

CO2 55.4H, Venous Blood Partial Pressure O2 38.7, Venous Blood Total Carbon 

Dioxide 29.3H, Venous Blood HCO3 27.6H, Venous Blood Oxygen Saturation 67.4, 

Venous Blood Base Excess 0.8


3/22/21 13:01: 


CBC/BMP


Laboratory Tests


3/22/21 11:23











 Assessment/Plan


Assessment/Plan:


Pt is a 80 year old male with PMH of ESRD on dialysis, DM, HTN, atrial 

fibrillation, COPD, CAD s/p CABG who presents to the ED with c/o rectal bleeding

and lightheadedness onset this morning, admitted for further evaluation of 

rectal bleeding. 





#Acute on chronic anemia 2/2 possible lower vs upper GI bleed 


- Pt current hgb is 8.8. Pt has hx of anemia of renal disease w/ baseline hbg is

9-10. As pt currently has bright red blood per rectum, believe the drop in hgb 

is most likely due to upper vs lower GI bleeding. 


- Differential also includes diverticulitis vs diverticulosis, mesenteric 

ischemia, AV malformation, IBD, polyp/malignancy. Given the location of his abd 

pain being worse on the L side and elevated WBC, it is possible that pt has 

diverticulitis. Given the possible GI bleed, diverticulosis and mesenteric 

ischemia is a concern. Mesenteric ischemia possible as pt has atrial 

fibrillation but is not anticoagulated. However, given that pt lactic acid is 

only slightly elevated, this is less likely. IBD is low on the differential list

as pt has had previous colonoscopy with no evidence of colonic inflammation. 


- CTA abd/pelvis ordered to eval for causes of abdominal pain


- Pt placed on clear liquid diet. IV protonix BID


- Will continue to monitor H&H q6H overnight. Plan to transfuse if hgb falls 

below 7. 


- Will hold home ASA to avoid any additional risk of bleeding. 





#Leukocytosis


- Pt was recently admitted and discharged from here due to CAP. He was 

discharged with prednisone at that time and is currently on prednisone. 

Leukocytosis likely due to steroid use. 


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 

pt daughter. Pt states he sees a podiatrist in regards to this. These areas are 

clean and well dressed and do not appear cellulitic so I do not believe this is 

a source of leukocytosis. 


- Pt currently afebrile, not tachycardic, and BP is 143/64. He has no other 

signs of infection as a cause of leukocytosis. 


- Pt has been recently d/c from the hospital and has been treated with abx. He 

c/o diarrhea so C. diff is a concern. C-diff by PCR has been ordered. 





#Lactic acidosis 


- Lactic acid elevated at 2.1.


- May be related to ESRD


- f/u 4 hour repeat lactic acid level





#End stage renal disease on dialysis 


- Pt receives dialysis every Tuesday, Thursday, and Saturday. 


- Nephrology has been consulted to manage dialysis. 





#Hx of GERD


- Will continue calcium carbonate PRN here.


- PPI as above





#Atrial fibrillation


- Pt has hx of atrial fibrillation and currently has pacemaker in place. His 

anticoagulant was discontinued 7 years ago presumably due to lower GI bleeding. 


- Currently on Metoprolol at home, on hold due to GI bleed.





#DM type 2


- Sliding scale insulin with hypoglycemic protocol in place. 





#Hypertension


- Amlodipine 2.5 mg PO QHS. 





#Cutaneous ulcers to b/l feet


- Pt has hx of cutaneous ulcers to b/l feet - bilaterally to both heels and the 

the lateral aspect of L foot. Pt reports seeing a podiatrist in regards to this 

issue. Pt states that his daughter changes the dressings on his foot daily.


- Will have pt f/u with podiatrist outpatient





#Oral candidiasis 


- During pt last admission, he developed oral candidiasis with complaints of 

dysphagia and odynophagia. He was started and discharged on oral fluconazole and

nystatin. 


- Will continue Fluconazole 100 mg PO and Nystatin 5mL QID to complete course.





DVT prophylaxis:


Teds and sequentials. 





Disposition:


Currently further working up causes of GI bleeding. Discharge pending clinical 

improvement.





Plan / VTE


VTE Prophylaxis Ordered?:  Yes (Teds and sequentials)





GME ATTESTATION


GME ATTESTATION


My faculty preceptor for this patient encounter was physically present during 

the encounter and was fully available. All aspects of the patient interview, 

examination, medical decision making process, and medical care plan development 

were reviewed and approved by the faculty preceptor. The faculty preceptor is 

aware and concurs with the plan as stated in the body of this note and will 

attest to such by his/her cosignature.





ATTENDING NOTE


I, Deo Leggett MD, have independently examined this patient and performed my 

own physical exam, as well as reviewed the documentation and edited where 

necessary. I have discussed in detail with the resident / student the findings 

and plan of treatment as documented by the resident / student and edited their 

note. I agree with their findings and treatment plan and have edited their 

documentation











Lashae SANTANA OMS-3             Mar 22, 2021 14:58


JUDY ADAM D.O.        Mar 22, 2021 16:14


DEO LEGGETT MD             Mar 24, 2021 09:55

## 2021-03-23 VITALS — DIASTOLIC BLOOD PRESSURE: 59 MMHG | SYSTOLIC BLOOD PRESSURE: 116 MMHG

## 2021-03-23 VITALS — SYSTOLIC BLOOD PRESSURE: 92 MMHG

## 2021-03-23 VITALS — DIASTOLIC BLOOD PRESSURE: 65 MMHG | SYSTOLIC BLOOD PRESSURE: 151 MMHG

## 2021-03-23 VITALS — DIASTOLIC BLOOD PRESSURE: 49 MMHG | SYSTOLIC BLOOD PRESSURE: 100 MMHG

## 2021-03-23 VITALS — OXYGEN SATURATION: 100 %

## 2021-03-23 VITALS — DIASTOLIC BLOOD PRESSURE: 53 MMHG | SYSTOLIC BLOOD PRESSURE: 107 MMHG

## 2021-03-23 VITALS — SYSTOLIC BLOOD PRESSURE: 121 MMHG | DIASTOLIC BLOOD PRESSURE: 57 MMHG

## 2021-03-23 VITALS — SYSTOLIC BLOOD PRESSURE: 102 MMHG | DIASTOLIC BLOOD PRESSURE: 65 MMHG

## 2021-03-23 VITALS — DIASTOLIC BLOOD PRESSURE: 49 MMHG | SYSTOLIC BLOOD PRESSURE: 105 MMHG

## 2021-03-23 VITALS — DIASTOLIC BLOOD PRESSURE: 58 MMHG | SYSTOLIC BLOOD PRESSURE: 114 MMHG

## 2021-03-23 VITALS — SYSTOLIC BLOOD PRESSURE: 107 MMHG | DIASTOLIC BLOOD PRESSURE: 58 MMHG

## 2021-03-23 VITALS — SYSTOLIC BLOOD PRESSURE: 100 MMHG | DIASTOLIC BLOOD PRESSURE: 47 MMHG

## 2021-03-23 VITALS — DIASTOLIC BLOOD PRESSURE: 60 MMHG | SYSTOLIC BLOOD PRESSURE: 127 MMHG

## 2021-03-23 VITALS — DIASTOLIC BLOOD PRESSURE: 93 MMHG | SYSTOLIC BLOOD PRESSURE: 130 MMHG

## 2021-03-23 VITALS — DIASTOLIC BLOOD PRESSURE: 58 MMHG | SYSTOLIC BLOOD PRESSURE: 132 MMHG

## 2021-03-23 VITALS — SYSTOLIC BLOOD PRESSURE: 75 MMHG | DIASTOLIC BLOOD PRESSURE: 43 MMHG

## 2021-03-23 VITALS — SYSTOLIC BLOOD PRESSURE: 69 MMHG | DIASTOLIC BLOOD PRESSURE: 41 MMHG

## 2021-03-23 VITALS — SYSTOLIC BLOOD PRESSURE: 145 MMHG | DIASTOLIC BLOOD PRESSURE: 61 MMHG

## 2021-03-23 VITALS — DIASTOLIC BLOOD PRESSURE: 52 MMHG | SYSTOLIC BLOOD PRESSURE: 118 MMHG

## 2021-03-23 VITALS — SYSTOLIC BLOOD PRESSURE: 130 MMHG | DIASTOLIC BLOOD PRESSURE: 60 MMHG

## 2021-03-23 VITALS — SYSTOLIC BLOOD PRESSURE: 119 MMHG | DIASTOLIC BLOOD PRESSURE: 90 MMHG

## 2021-03-23 VITALS — DIASTOLIC BLOOD PRESSURE: 79 MMHG | SYSTOLIC BLOOD PRESSURE: 114 MMHG

## 2021-03-23 VITALS — DIASTOLIC BLOOD PRESSURE: 78 MMHG | SYSTOLIC BLOOD PRESSURE: 115 MMHG

## 2021-03-23 VITALS — SYSTOLIC BLOOD PRESSURE: 136 MMHG | DIASTOLIC BLOOD PRESSURE: 63 MMHG

## 2021-03-23 VITALS — DIASTOLIC BLOOD PRESSURE: 62 MMHG | SYSTOLIC BLOOD PRESSURE: 90 MMHG

## 2021-03-23 VITALS — DIASTOLIC BLOOD PRESSURE: 51 MMHG | SYSTOLIC BLOOD PRESSURE: 108 MMHG

## 2021-03-23 VITALS — SYSTOLIC BLOOD PRESSURE: 116 MMHG | DIASTOLIC BLOOD PRESSURE: 59 MMHG

## 2021-03-23 VITALS — DIASTOLIC BLOOD PRESSURE: 74 MMHG | SYSTOLIC BLOOD PRESSURE: 129 MMHG

## 2021-03-23 VITALS — SYSTOLIC BLOOD PRESSURE: 148 MMHG | DIASTOLIC BLOOD PRESSURE: 66 MMHG

## 2021-03-23 VITALS — OXYGEN SATURATION: 98 %

## 2021-03-23 VITALS — DIASTOLIC BLOOD PRESSURE: 67 MMHG | SYSTOLIC BLOOD PRESSURE: 148 MMHG

## 2021-03-23 VITALS — SYSTOLIC BLOOD PRESSURE: 107 MMHG | DIASTOLIC BLOOD PRESSURE: 53 MMHG

## 2021-03-23 VITALS — DIASTOLIC BLOOD PRESSURE: 43 MMHG | SYSTOLIC BLOOD PRESSURE: 105 MMHG

## 2021-03-23 VITALS — SYSTOLIC BLOOD PRESSURE: 102 MMHG

## 2021-03-23 VITALS — DIASTOLIC BLOOD PRESSURE: 56 MMHG | SYSTOLIC BLOOD PRESSURE: 98 MMHG

## 2021-03-23 VITALS — SYSTOLIC BLOOD PRESSURE: 95 MMHG | DIASTOLIC BLOOD PRESSURE: 55 MMHG

## 2021-03-23 VITALS — SYSTOLIC BLOOD PRESSURE: 170 MMHG | DIASTOLIC BLOOD PRESSURE: 71 MMHG

## 2021-03-23 VITALS — DIASTOLIC BLOOD PRESSURE: 66 MMHG | SYSTOLIC BLOOD PRESSURE: 144 MMHG

## 2021-03-23 VITALS — SYSTOLIC BLOOD PRESSURE: 104 MMHG | DIASTOLIC BLOOD PRESSURE: 42 MMHG

## 2021-03-23 VITALS — DIASTOLIC BLOOD PRESSURE: 60 MMHG | SYSTOLIC BLOOD PRESSURE: 133 MMHG

## 2021-03-23 VITALS — SYSTOLIC BLOOD PRESSURE: 78 MMHG | OXYGEN SATURATION: 94 %

## 2021-03-23 VITALS — DIASTOLIC BLOOD PRESSURE: 55 MMHG | SYSTOLIC BLOOD PRESSURE: 95 MMHG

## 2021-03-23 VITALS — SYSTOLIC BLOOD PRESSURE: 108 MMHG | DIASTOLIC BLOOD PRESSURE: 51 MMHG

## 2021-03-23 VITALS — SYSTOLIC BLOOD PRESSURE: 76 MMHG | DIASTOLIC BLOOD PRESSURE: 44 MMHG

## 2021-03-23 VITALS — SYSTOLIC BLOOD PRESSURE: 90 MMHG | DIASTOLIC BLOOD PRESSURE: 62 MMHG

## 2021-03-23 VITALS — SYSTOLIC BLOOD PRESSURE: 132 MMHG | DIASTOLIC BLOOD PRESSURE: 59 MMHG

## 2021-03-23 VITALS — DIASTOLIC BLOOD PRESSURE: 55 MMHG | SYSTOLIC BLOOD PRESSURE: 104 MMHG

## 2021-03-23 VITALS — DIASTOLIC BLOOD PRESSURE: 65 MMHG | SYSTOLIC BLOOD PRESSURE: 106 MMHG

## 2021-03-23 VITALS — SYSTOLIC BLOOD PRESSURE: 118 MMHG | DIASTOLIC BLOOD PRESSURE: 71 MMHG

## 2021-03-23 VITALS — SYSTOLIC BLOOD PRESSURE: 124 MMHG | DIASTOLIC BLOOD PRESSURE: 58 MMHG

## 2021-03-23 VITALS — SYSTOLIC BLOOD PRESSURE: 78 MMHG

## 2021-03-23 VITALS — SYSTOLIC BLOOD PRESSURE: 112 MMHG | DIASTOLIC BLOOD PRESSURE: 52 MMHG

## 2021-03-23 LAB
ALBUMIN SERPL BCG-MCNC: 2.5 GM/DL (ref 3.2–5.2)
ALT SERPL W P-5'-P-CCNC: 15 U/L (ref 12–78)
APTT BLD: 29 SECONDS (ref 24.2–38.5)
BASE EXCESS BLDA CALC-SCNC: -0.4 MMOL/L (ref -2–2)
BASE EXCESS BLDA CALC-SCNC: 0.6 MMOL/L (ref -2–2)
BILIRUB CONJ SERPL-MCNC: 0.2 MG/DL (ref 0–0.2)
BILIRUB SERPL-MCNC: 0.4 MG/DL (ref 0.2–1)
BUN SERPL-MCNC: 101 MG/DL (ref 7–18)
CALCIUM SERPL-MCNC: 7.3 MG/DL (ref 8.8–10.2)
CHLORIDE SERPL-SCNC: 101 MEQ/L (ref 98–107)
CK MB CFR.DF SERPL CALC: 6.21
CK MB SERPL-MCNC: 4.1 NG/ML (ref ?–3.6)
CK SERPL-CCNC: 66 U/L (ref 39–308)
CO2 BLDA CALC-SCNC: 25.2 MEQ/L (ref 23–31)
CO2 BLDA CALC-SCNC: 26 MEQ/L (ref 23–31)
CO2 SERPL-SCNC: 26 MEQ/L (ref 21–32)
CREAT SERPL-MCNC: 6.36 MG/DL (ref 0.7–1.3)
D DIMER PPP DDU-MCNC: 1748.84 NG/ML (ref ?–500)
FIBRINOGEN PPP-MCNC: 197 MG/DL (ref 221–452)
GFR SERPL CREATININE-BSD FRML MDRD: 9 ML/MIN/{1.73_M2} (ref 35–?)
GLUCOSE SERPL-MCNC: 160 MG/DL (ref 70–100)
HCO3 BLDA-SCNC: 24 MEQ/L (ref 22–26)
HCO3 BLDA-SCNC: 24.9 MEQ/L (ref 22–26)
HCO3 STD BLDA-SCNC: 24.1 MEQ/L (ref 22–26)
HCO3 STD BLDA-SCNC: 25 MEQ/L (ref 22–26)
HCT VFR BLD AUTO: 25.8 % (ref 42–52)
HCT VFR BLD AUTO: 27.6 % (ref 42–52)
HGB BLD-MCNC: 8.7 G/DL (ref 13.5–17.5)
HGB BLD-MCNC: 9.3 G/DL (ref 13.5–17.5)
INR PPP: 1.34
MCH RBC QN AUTO: 31.1 PG (ref 27–33)
MCHC RBC AUTO-ENTMCNC: 33.7 G/DL (ref 32–36.5)
MCV RBC AUTO: 92.3 FL (ref 80–96)
PCO2 BLDA: 38.3 MMHG (ref 35–45)
PCO2 BLDA: 38.6 MMHG (ref 35–45)
PH BLDA: 7.41 UNITS (ref 7.35–7.45)
PH BLDA: 7.43 UNITS (ref 7.35–7.45)
PLATELET # BLD AUTO: 137 10^3/UL (ref 150–450)
PO2 BLDA: 137 MMHG (ref 75–100)
PO2 BLDA: 79.7 MMHG (ref 75–100)
POTASSIUM SERPL-SCNC: 4.9 MEQ/L (ref 3.5–5.1)
PROT SERPL-MCNC: 4.9 GM/DL (ref 6.4–8.2)
PROTHROMBIN TIME: 16.9 SECONDS (ref 12.5–14.3)
RBC # BLD AUTO: 2.99 10^6/UL (ref 4.3–6.1)
SAO2 % BLDA: 96 % (ref 95–99)
SAO2 % BLDA: 98.9 % (ref 95–99)
SODIUM SERPL-SCNC: 136 MEQ/L (ref 136–145)
TROPONIN I SERPL-MCNC: 0.04 NG/ML (ref ?–0.1)
WBC # BLD AUTO: 18.6 10^3/UL (ref 4–10)

## 2021-03-23 PROCEDURE — 30233R1 TRANSFUSION OF NONAUTOLOGOUS PLATELETS INTO PERIPHERAL VEIN, PERCUTANEOUS APPROACH: ICD-10-PCS | Performed by: SPECIALIST

## 2021-03-23 PROCEDURE — 5A1D70Z PERFORMANCE OF URINARY FILTRATION, INTERMITTENT, LESS THAN 6 HOURS PER DAY: ICD-10-PCS | Performed by: INTERNAL MEDICINE

## 2021-03-23 PROCEDURE — 0DB38ZX EXCISION OF LOWER ESOPHAGUS, VIA NATURAL OR ARTIFICIAL OPENING ENDOSCOPIC, DIAGNOSTIC: ICD-10-PCS | Performed by: INTERNAL MEDICINE

## 2021-03-23 PROCEDURE — 30233K1 TRANSFUSION OF NONAUTOLOGOUS FROZEN PLASMA INTO PERIPHERAL VEIN, PERCUTANEOUS APPROACH: ICD-10-PCS | Performed by: INTERNAL MEDICINE

## 2021-03-23 PROCEDURE — 06HM33Z INSERTION OF INFUSION DEVICE INTO RIGHT FEMORAL VEIN, PERCUTANEOUS APPROACH: ICD-10-PCS | Performed by: SPECIALIST

## 2021-03-23 RX ADMIN — CALCIUM CARBONATE (ANTACID) CHEW TAB 500 MG SCH MG: 500 CHEW TAB at 17:41

## 2021-03-23 RX ADMIN — IPRATROPIUM BROMIDE AND ALBUTEROL SULFATE SCH ML: .5; 3 SOLUTION RESPIRATORY (INHALATION) at 14:00

## 2021-03-23 RX ADMIN — SODIUM CHLORIDE, PRESERVATIVE FREE SCH ML: 5 INJECTION INTRAVENOUS at 06:37

## 2021-03-23 RX ADMIN — INSULIN LISPRO SCH UNITS: 100 INJECTION, SOLUTION INTRAVENOUS; SUBCUTANEOUS at 17:45

## 2021-03-23 RX ADMIN — NYSTATIN SCH ML: 100000 SUSPENSION ORAL at 09:00

## 2021-03-23 RX ADMIN — IPRATROPIUM BROMIDE AND ALBUTEROL SULFATE SCH ML: .5; 3 SOLUTION RESPIRATORY (INHALATION) at 07:09

## 2021-03-23 RX ADMIN — INSULIN LISPRO SCH UNITS: 100 INJECTION, SOLUTION INTRAVENOUS; SUBCUTANEOUS at 00:00

## 2021-03-23 RX ADMIN — NYSTATIN SCH ML: 100000 SUSPENSION ORAL at 17:00

## 2021-03-23 RX ADMIN — INSULIN LISPRO SCH UNITS: 100 INJECTION, SOLUTION INTRAVENOUS; SUBCUTANEOUS at 06:36

## 2021-03-23 RX ADMIN — SODIUM CHLORIDE, PRESERVATIVE FREE SCH ML: 5 INJECTION INTRAVENOUS at 14:27

## 2021-03-23 RX ADMIN — CALCIUM CARBONATE (ANTACID) CHEW TAB 500 MG SCH MG: 500 CHEW TAB at 08:30

## 2021-03-23 RX ADMIN — INSULIN LISPRO SCH UNITS: 100 INJECTION, SOLUTION INTRAVENOUS; SUBCUTANEOUS at 12:00

## 2021-03-23 RX ADMIN — CALCIUM CARBONATE (ANTACID) CHEW TAB 500 MG SCH MG: 500 CHEW TAB at 12:21

## 2021-03-23 RX ADMIN — NYSTATIN SCH ML: 100000 SUSPENSION ORAL at 12:21

## 2021-03-23 RX ADMIN — DEXTROSE MONOHYDRATE SCH MG: 50 INJECTION, SOLUTION INTRAVENOUS at 09:18

## 2021-03-23 NOTE — DS.PDOC
Discharge Summary


General


Date of Admission


Mar 22, 2021 at 14:06


Date of Discharge


March 23 2021


Attending Physician:  DEO LEGGETT MD


Specialist/Consultants Involve:  HELENA WILLETT MD





Discharge Summary


PROCEDURES PERFORMED DURING STAY: EGD





ADMITTING DIAGNOSES: 


Acute on chronic anemia secondary to GI bleed


ESRD on dialysis TThS. 


CAD s/p CABG. 


DM. 


HTN.


Atrial fibrillation now s/p pacemaker. 


COPD. 


Legally blind. 


Skin cancer s/p resection.





DISCHARGE DIAGNOSES:


Acute on chronic anemia secondary to GI bleed


ESRD on dialysis TThS. 


CAD s/p CABG. 


DM. 


HTN.


Atrial fibrillation now s/p pacemaker. 


COPD. 


Legally blind. 


Skin cancer s/p resection.





COMPLICATIONS/CHIEF COMPLAINT: Anemia Gi Bleed.





HISTORY OF PRESENT ILLNESS: 80 year old male with PMH of ESRD on dialysis, DM, 

HTN, atrial fibrillation, COPD, CAD s/p CABG who presents to the ED with c/o 

rectal bleeding and lightheadedness. Pt states that he was getting up from the 

toilet when he felt lightheaded and slid off the toilet onto the ground. Denies 

actual fall or hitting his head. Pt is legally blind and lives with his daughter

who takes care of him. States that his daughter noted bright red blood in the 

toilet at that time. Admits to diarrhea with loose stools 3-4x a day since 

discharge from hospital on 3/18/21 for CAP. Admits to nausea and abd pain. He 

states that he has had similar sx 7 years ago at which time he had a colonoscopy

done. Pt had 2 polyps removed at the time of colonoscopy but pt reports that it 

was otherwise normal. He states that his blood thinners and glipizide was 

discontinued at that time. Denies hx of hemorrhoids. He states that he receives 

dialysis on Tue, Thur, and Sat. His last dialysis session was on Saturday 

without any complications. Pt was admitted and discharged from here on 3/18/21 

after treatment for CAP with abx and prednisone. His discharge at that time was 

delayed by a day due to severe heartburn that was improved with PPI therapy.





HOSPITAL COURSE: Patient was admitted to the hospital and started on IV protonix

BID for presumed GI bleed. CTA abdomen/pelvis results below, concerning for 

chronic ischemic bowel disease. He was started on clear liquid diet and 

monitored H/H q6h. Overnight the patient again developed copious bloody bowel 

movements. Hg dropped from 9.2 to 6.3. Patient was transferred to ICU and 

transfused with 4 units PRBCs, 2 units plasma, and 1 unit platelets. Patient was

made NPO at this time. He did require blood pressure support with levophed for 

less than an hour overnight. Dr. Willett, intensivist, placed a femoral dialysis 

catheter for better access. After transfusions he was able to be titrated off 

levophed and he did not have further bowel movements. His condition stabilized 

and he was able to undergo tagged RBC scan which did not show a source of the 

bleed. Dr. Mcginnis of gastroenterology was consulted and underwent EGD, which did

not show a source of the bleed. The patient was started on prep for planned 

colonoscopy on 3/24/2021. The patient required his regular hemodialysis which 

was started at bedside in the ICU. The patient became hypotensive during HD and 

required levophed for blood pressure support again. Additionally, he began to 

have large bloody bowel movements again. Hemodialysis was stopped early. The 

patient was transfused with 1 unit PRBCs at this time. At this point, Dr. Mcginnis

felt colonoscopy would have more risk than benefit. The case was also discussed 

with general surgery Dr. Franz who felt the patient was not a good candidate 

for surgical intervention. At this point, [facility] was contacted for 

transfer in order to provide the patient access to angiography with embolization

in order to control the bleeding. Dr. Geronimo from [facility] accepted the pa

tient for transfer. An addition unit of PRBCs was ordered to transfuse during 

transfer, making a total of 6 units PRBCs during admission at [facility]. 





DISCHARGE MEDICATIONS: Please see below.


 


ALLERGIES: Please see below.





PHYSICAL EXAMINATION ON DISCHARGE:


VITAL SIGNS: Please see below.


GENERAL: Pt is laying in bed and appears to be sleeping and fatigued.


HEENT: NC/AT.Sclera non-icteric.


CHEST: Clear to auscultation with good air movement. No rales, rhonchi or 

wheezing appreciated.


HEART: Irregularly irregular rhythm. Normal rate. Normal S1/S2. No murmurs, 

rubs, or gallops appreciated.


ABD: Soft, tender to palpation in all four quadrants, nondistended, bowel sounds

present


SKIN: There are still multiple areas of bruising noted but are unchanged from 

time of admission. 


EXTREMITY: No pitting edema noted. DP pulses present bilaterally. Ulcers present

without drainage or erythema.


NEURO: Alert. No focal deficits appreciated





LABORATORY DATA: Please see below.





IMAGING: 


-CTA abdomen/pelvis


   Diffuse atherosclerotic calcifications of the abdominal aorta and its 

branches.  Mild


   narrowing origin of celiac artery.  High-grade severe stenosis at the origin 

of the


   superior mesenteric artery, with stenosis at least 90%.  There is greater 

than 50%


   stenosis at the origin of the inferior mesenteric artery.


   High-grade stenosis proximal left SFA, moderate stenosis proximal right SFA.


   Mild patchy atelectasis and or infiltrate bilateral lung bases.


-Nuclear RBC scan


   Negative radionuclide gastrointestinal bleed scan.  No localized bleeding 

source is seen.





PROGNOSIS: Guarded





ACTIVITY: Bed rest





DIET: NPO diet





DISCHARGE PLAN: Transferred to [facility] for IR embolization.





DISPOSITION: Pending treatment of GI bleed, prognosis guarded





DISCHARGE CONDITION: Stable.





TIME SPENT ON DISCHARGE: Greater than 35 minutes.





Vital Signs/I&Os





Vital Signs








  Date Time  Temp Pulse Resp B/P (MAP) Pulse Ox O2 Delivery O2 Flow Rate FiO2


 


3/23/21 18:15  69 16 148/66 (93) 97 Room Air  


 


3/23/21 17:48 97.5       


 


3/23/21 10:00       2.0 














I&O- Last 24 Hours up to 6 AM 


 


 3/23/21





 06:00


 


Intake Total 1215 ml


 


Balance 1215 ml











Laboratory Data


Labs 24H


Laboratory Tests 2


3/22/21 20:35: 


Lactic Acid Level 5.4*H, Iron Level 68, Total Iron Binding Capacity 106L, 

Transferrin % Saturation 64.2H, Ferritin 1360H, Vitamin B12 Level 454, Folate 

4.1L


3/22/21 21:09: 


Clostridium difficile [id number] PRESUMPTIVE NEGATIVE, Clostridium difficile 

Toxin (PCR) NEGATIVE


3/23/21 00:28: Bedside Glucose (Misc Panel) 264H


3/23/21 05:41: Bedside Glucose (Misc Panel) 154H


3/23/21 07:08: 


Blood Gas Bicarbonate Standard 24.1, Arterial Blood pH 7.412, Arterial Blood 

Partial Pressure CO2 38.6, Arterial Blood Partial Pressure O2 137.0H, Arterial 

Blood Total CO2 25.2, Arterial Blood HCO3 24.0, Arterial Blood Base Excess -0.4,

Arterial Blood Oxygen Saturation 98.9


3/23/21 08:19: 


Anion Gap 9, Glomerular Filtration Rate 9.0L, Lactic Acid Level 1.3, Calcium 

Level 7.3L, Whole Blood Ionized Calcium 3.4*L, Total Bilirubin 0.4, Direct 

Bilirubin 0.2, Aspartate Amino Transf (AST/SGOT) 9, Alanine Aminotransferase 

(ALT/SGPT) 15, Alkaline Phosphatase 65, Total Creatine Kinase 66, Creatine 

Kinase MB 4.1H, Creatine Kinase MB Relative Index 6.21H, Troponin I 0.04, Total 

Protein 4.9L, Albumin 2.5L, Albumin/Globulin Ratio 1.0


3/23/21 08:31: Nucleated Red Blood Cells % (auto) 0.1H


3/23/21 09:35: 


Prothrombin Time 16.9H, Prothromb Time International Ratio 1.34, Activated 

Partial Thromboplast Time 29.0, Fibrinogen 197L, D-Dimer, Quantitative 1748.84H


3/23/21 12:26: Bedside Glucose (Misc Panel) 135H


3/23/21 14:09: Lab Scanned Report Miscellaneous Lab


3/23/21 16:23: 


Blood Gas Bicarbonate Standard 25.0, Arterial Blood pH 7.430, Arterial Blood 

Partial Pressure CO2 38.3, Arterial Blood Partial Pressure O2 79.7, Arterial 

Blood Total CO2 26.0, Arterial Blood HCO3 24.9, Arterial Blood Base Excess 0.6, 

Arterial Blood Oxygen Saturation 96.0


3/23/21 17:41: Bedside Glucose (Misc Panel) 194H


CBC/BMP


Laboratory Tests


3/22/21 20:35








3/23/21 08:19








3/23/21 08:31








3/23/21 14:41








FSBS





Laboratory Tests








Test


 3/23/21


00:28 3/23/21


05:41 3/23/21


12:26 3/23/21


17:41 Range/Units


 


 


Bedside Glucose (Misc Panel) 264 154 135 194   MG/DL











Discharge Medications


Scheduled


Pantoprazole Sodium (Pantoprazole Sodium) 40 Mg Vial, 40 MG IV Q12H





Allergies


Coded Allergies:  


     No Known Allergies (Unverified , 3/22/21)





GME ATTESTATION


GME ATTESTATION


My faculty preceptor for this patient encounter was physically present during 

the encounter and was fully available. All aspects of the patient interview, 

examination, medical decision making process, and medical care plan development 

were reviewed and approved by the faculty preceptor. The faculty preceptor is 

aware and concurs with the plan as stated in the body of this note and will 

attest to such by his/her cosignature.





ATTENDING NOTE


I, Deo Leggett MD, have independently examined this patient and performed my 

own physical exam, as well as reviewed the documentation and edited where 

necessary. I have discussed in detail with the resident / student the findings 

and plan of treatment as documented by the resident / student and edited their 

note. I agree with their findings and treatment plan and have edited their 

documentation. 





Total time spent on this discharge including coordination of care, review of 

chart, review and additions in the documentation of the resident and actual 

patient contact is around 35 minutes











JUDY ADAM D.O.        Mar 23, 2021 19:02


DEO LEGGETT MD             Mar 31, 2021 14:37

## 2021-03-23 NOTE — IPNPDOC
Text Note


Date of Service


The patient was seen on 3/23/21.





NOTE


Subjective:


Overnight, pt had several episodes of blood stools and an episode of near 

syncope. His hgb fell to 6.2 around 20:35 last night and pt decompensated with 

falling BP's. Pt SPB fell to 75 on doppler at one point. Due to pt being 

hemodynamically unstable, he was transferred to ICU, transfused 4 units PRBCs, 2

units FFP, and 1 unit of platelets, and required up to 10mcg of Levophed for 

blood pressures support. Dr. Mcginnis, GI, was consulted for possible endoscopy. 

Dr. Mcginnis plans for tagged RBC scan this morning and scoping pt later today if 

he is still hemodynamically stable. Dr. Franz was also called for possible 

surgical intervention for concern of ischemic bowel. Due to the hypotension with

possible need for continued pressor support, Dr. Juarez was consulted.





Objective: 


VITALS: See below. 


GENERAL: Pt is laying in bed and appears to be sleeping and fatigued. Difficult 

to arouse. 


HEENT: NC/AT. Pt is sleeping in bed and occasionally opens his eyes. Sclera non-

icteric. Lids normal. 


CHEST: Clear to auscultation with good air movement. No rales, rhonchi or 

wheezing appreciated.


HEART: Regular rate and rhythm. Nl S1/S2. No murmurs, rubs, or gallops 

appreciated.


ABD: Difficult to assess if pt is having any tenderness to palpation as he is 

sleeping for most of the exam. There are still multiple areas of bruising noted 

but are unchanged from time of admission. 


EXTREMITY: No pitting edema noted. DP pulses present bilaterally. 


NEURO: Pt is laying in bed asleep and is difficult to arouse. 





Assessment/Plan:


Pt is a 80 year old male with PMH of ESRD on dialysis, DM, HTN, atrial fi

brillation, COPD, CAD s/p CABG who presents to the ED with c/o rectal bleeding 

and lightheadedness onset this morning, admitted for further evaluation of 

rectal bleeding. 





#Acute on chronic anemia 2/2 possible lower vs upper GI bleed 


- Pt decompensated overnight and became hemodynamically unstable after a couple 

of episodes of aria red blood in stool. His hemoglobin decreased to 6.2 at its 

lowest. He was given 4 units PRBC's, 2 units FFP's, and 1 unit platelet as well 

as 10mcg Levophed and IV fluids. He was stabilized and his BP is currently 

105/49, HR 70, and sating 100% on 2 L NC. His hgb has since increased to 9.3 

after transfusions. Dr. Juarez placed a central line. Pt is currently off 

pressor support.


- Differentials still includes diverticulitis vs diverticulosis, mesenteric 

ischemia, AV malformation, IBD, polyp/malignancy.


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 Along with 

lactic acid increased to 5.4 yesterday, mesenteric ischemia is higher on our di

fferential list. 


- Pt placed on NPO. IV protonix BID


- Will continue to monitor H&H q6H. 


- Will hold home ASA to avoid any additional risk of bleeding. 


- Poor/guarded prognosis given pt acute decompensation. 





#Leukocytosis


- Pt was recently admitted and discharged from here due to CAP. He was di

scharged with prednisone at that time and is currently on prednisone. 

Leukocytosis likely due to steroid use. 


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 

pt daughter. Pt states he sees a podiatrist in regards to this. These areas are 

clean and well dressed and do not appear cellulitic so I do not believe this is 

a source of leukocytosis. 


- Pt currently afebrile, not tachycardic, and BP is 143/64. He has no other 

signs of infection as a cause of leukocytosis. 


- C diff testing negative





#Lactic acidosis 


- Lactic acid has elevated to 5.4 at its maximum yesterday. Repeat lactic acid 

today was 1.3. 


- May be related to ESRD, although mesenteric ischemia is another possibility. 


- trend lactic acid level





#End stage renal disease on dialysis 


- Pt receives dialysis every Tuesday, Thursday, and Saturday. 


- Nephrology has been consulted to manage dialysis. 





#Hx of GERD


- Will continue calcium carbonate PRN here.


- PPI as above





#Atrial fibrillation


- Pt has hx of atrial fibrillation and currently has pacemaker in place. His 

anticoagulant was discontinued 7 years ago presumably due to lower GI bleeding. 


- Currently on Metoprolol at home, on hold due to GI bleed.





#DM type 2


- Sliding scale insulin with hypoglycemic protocol in place. 





#Hypertension


- Amlodipine 2.5 mg PO QHS held because pt has decompensated overnight. 





#Cutaneous ulcers to b/l feet


- Pt has hx of cutaneous ulcers to b/l feet - bilaterally to both heels and the 

the lateral aspect of L foot. Pt reports seeing a podiatrist in regards to this 

issue. Pt states that his daughter changes the dressings on his foot daily.


- Will have pt f/u with podiatrist outpatient





#Oral candidiasis 


- During pt last admission, he developed oral candidiasis with complaints of 

dysphagia and odynophagia. He was started and discharged on oral fluconazole and

nystatin. 


- Will continue Fluconazole 100 mg PO and Nystatin 5mL QID to complete course.





DVT prophylaxis:


Teds and sequentials. 





Disposition:


Pt is scheduled to have tagged RBC scan done this morning. Possible scope today 

if pt remains hemodynamically stable. Believe pt has a poor prognosis given 

continued rectal bleeding and acute decompensation overnight. Will continue to 

monitor.





VS,Fishbone, I+O


VS, Fishbone, I+O


Laboratory Tests


3/22/21 11:23








3/22/21 18:05








3/22/21 20:35








3/23/21 08:19








3/23/21 08:31











Vital Signs








  Date Time  Temp Pulse Resp B/P (MAP) Pulse Ox O2 Delivery O2 Flow Rate FiO2


 


3/23/21 09:45  70 16 105/49 (67) 100 Nasal Cannula 2.0 


 


3/23/21 09:00 97.7       














I&O- Last 24 Hours up to 6 AM 


 


 3/23/21





 06:00


 


Intake Total 1215 ml


 


Balance 1215 ml











GME ATTESTATION


GME ATTESTATION


My faculty preceptor for this patient encounter was physically present during 

the encounter and was fully available. All aspects of the patient interview, 

examination, medical decision making process, and medical care plan development 

were reviewed and approved by the faculty preceptor. The faculty preceptor is 

aware and concurs with the plan as stated in the body of this note and will 

attest to such by his/her cosignature.





ATTENDING NOTE


I, Deo Leggett MD, have independently examined this patient and performed my 

own physical exam, as well as reviewed the documentation and edited where 

necessary. I have discussed in detail with the resident / student the findings 

and plan of treatment as documented by the resident / student and edited their 

note. I agree with their findings and treatment plan and have edited their 

documentation











Lashae SANTANA-3             Mar 23, 2021 11:17


JUDY ADAM D.O.        Mar 23, 2021 11:45


DEO LEGGETT MD             Mar 24, 2021 09:56

## 2021-03-23 NOTE — REP
INDICATION:

acute GI bleed.



COMPARISON:

None.



TECHNIQUE:

27.1 mCi of technetium 99 M are tagged RBC ultra tag kit was injected.  Anterior flow

study is acquired and sequential 5 minutes anterior planar images are acquired of the

abdomen and pelvis for a imaging interval of 60 minutes.



FINDINGS:

Anterior flow study is unremarkable.  Sequential 5 minutes images show no localized

area of gastrointestinal bleeding.  Normal hepatic, splenic, and blood pool uptake is

seen.



IMPRESSION:

Negative radionuclide gastrointestinal bleed scan.  No localized bleeding source is

seen.





<Electronically signed by Goran Toussaint > 03/23/21 [id number]

## 2021-03-23 NOTE — ECGEPIP
[facility]

                                       

                                       Test Date:    2021

Pat Name:     ALVARADO MARTINEZ              Department:   

Patient ID:   [id number]                 Room:         [place] [address]

Gender:       Male                     Technician:   lisandra

:          1940               Requested By: HELENA WILLETT 

Order Number: [id number]     Reading MD:   Adrián Mullins

                                 Measurements

Intervals                              Axis          

Rate:         70                       P:            

VT:                                    QRS:          -73

QRSD:         178                      T:            74

QT:           472                                    

QTc:          509                                    

                           Interpretive Statements

A-V sequential pacemaker

Pacemaker activity is new since 3/9/2021

Electronically Signed on 3- 20:41:00 EDT by Adrián Mullins

## 2021-03-23 NOTE — CR
GASTROENTEROLOGY CONSULTATION



DATE: 03/23/2021



REQUESTING PHYSICIAN: Hospitalist Service. 



REASON FOR CONSULTATION: Acute GI bleeding.



HISTORY OF PRESENT ILLNESS: This is an 80-year-old gentleman with a COPD recent

exacerbation on prednisone. He also has a history of atrial fibrillation,

coronary artery disease; he is status post bypass surgery. The patient is also

on hemodialysis Monday, Wednesday and Friday. He was recently discharged from

the hospital after a COPD exacerbation. The patient returns back to the

hospital after several episodes of bright red blood per rectum. He was

lightheaded and weak. He was admitted on 03/22/2021 a.m. and appeared to be

relatively stable. Overnight he suddenly developed several bouts of bright red

blood and maroon stools. He was also hypotensive and was transferred to the ICU

for further evaluation and management. He was transfused 2 units of packed RBCs

rapidly, he received I.V. fluids and he was placed on a pressor drip. His

vitals have improved and pressors are being weaned off as we speak. We placed

an NG-tube at bedside showing clear fluid, but no bile. No overt gastric

bleeding is notable. 



PAST MEDICAL HISTORY:

1.  End-stage renal disease on hemodialysis. 

2.  Hypertension. 

3.  Diabetes. 

4.  Atrial fibrillation. 

5.  Status post pacemaker. 

6.  COPD. 

7.  Patient is legally blind.



PAST SURGICAL HISTORY:

1.  Cholecystectomy. 

2.  Bypass surgery.



SOCIAL HISTORY: Negative for current alcohol although he used to drink heavily.

Negative for tobacco. 



PHYSICAL EXAMINATION: Vitals: Temperature 96.7, pulse 72, respiratory rate 18,

blood pressure 98/56, 116/59, pulse ox 96%/96% on 4 liters oxygen via nasal

cannula. 

General: He is awake and alert. 

HEENT: Grossly without abnormality except patient is legally blind; he is able

to see shadows and some colors.

Neck: Negative for lymphadenopathy or thyromegaly. 

Chest: Coarse, distant breath sounds, but no rhonchi or crackles.

Heart: Regular rate and rhythm, S1 and S2, no murmurs or gallops.

Abdomen: Soft, nontender, good bowel sounds, no masses palpable.

Extremities: 1+ pedal edema.



IMAGING DATA:  03/22/2021 CT angiography impression: Diffuse atherosclerotic

calcifications of the abdominal aorta and its branches, mild narrowing at the

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

origin at the origin of the inferior mesenteric artery. High grade stenosis

proximal left SFA, moderate stenosis proximal right SFA. Mild patchy

atelectasis or infiltrate bilateral lung bases. 



LABORATORY DATA: Hemoglobin 8.8, 9.2, 6.2, WBC 20.4. PT-INR 1.19. 



IMPRESSION:  Acute bright red blood per rectum/GI bleeding which is deemed to

be painless. Differential diagnosis includes most likely lower GI bleeding

versus a rapid upper GI bleed. The latter is most likely excluded with a clear

NG-tube lavage; however, not entirely impossible. 



RECOMMENDATIONS:

1.  Transfuse and stabilize.

2.  Tagged RBC scan this morning stat.

3.  Once stabilized EGD and colonoscopy will be performed.

## 2021-03-23 NOTE — ROOR
________________________________________________________________________________

Patient Name: Shemar Mendez              Procedure Date: 3/23/2021 1:40 PM

MRN: [id number]                          Account Number: [id number]

YOB: 1940               Age: 80

Room: [facility]                            Gender: Male

Note Status: Finalized                 

________________________________________________________________________________

 

Procedure:            Upper GI endoscopy

Indications:          Active gastrointestinal bleeding, Gastrointestinal 

                      bleeding of unknown origin

Providers:            Rashard MCGINNIS MD

Referring MD:         2. Inpatient 2. Inpatient

Requesting Provider:  

Medicines:            Monitored Anesthesia Care

Complications:        No immediate complications.

________________________________________________________________________________

Procedure:            Pre-Anesthesia Assessment:

                      - The heart rate, respiratory rate, oxygen saturations, 

                      blood pressure, adequacy of pulmonary ventilation, and 

                      response to care were monitored throughout the procedure.

                      The Endoscope was introduced through the mouth, and 

                      advanced to the second part of duodenum. The upper GI 

                      endoscopy was accomplished without difficulty. The 

                      patient tolerated the procedure well.

                                                                                

Findings:

     Moderately severe esophagitis with no bleeding was found in the lower 

     third of the esophagus. Biopsies were taken with a cold forceps for 

     histology.

     The entire examined stomach was normal.

     The examined duodenum was normal.

                                                                                

Impression:           - Moderate esophagitis. Biopsied.

                      - Normal stomach.

                      - Normal examined duodenum.

Recommendation:       - Perform a colonoscopy tomorrow.

                                                                                

Procedure Code(s):    --- Professional ---

                      86170, Esophagogastroduodenoscopy, flexible, transoral; 

                      with biopsy, single or multiple

Diagnosis Code(s):    --- Professional ---

                      K92.2, Gastrointestinal hemorrhage, unspecified

                      K20.9, Esophagitis, unspecified

 

CPT copyright 2019 [employer]. All rights reserved.

 

The codes documented in this report are preliminary and upon  review may 

be revised to meet current compliance requirements.

 

Rashard Mcginnis MD

________________

Rashard MCGINNIS MD

3/23/2021 1:57:52 PM

Electronically signed by Rashard MCGINNIS MD

Number of Addenda: 0

 

Note Initiated On: 3/23/2021 1:40 PM

Estimated Blood Loss: Estimated blood loss: none.

## 2021-03-24 NOTE — CCN
CRITICAL CARE CONSULTATION



DATE:  03/22/2021



CHIEF COMPLAINT:   Gastrointestinal (GI) bleed.



HISTORY OF PRESENT ILLNESS:  Mr. Mendez is an 80-year-old male with a past

medical history of end-stage renal disease on hemodialysis Tuesday, Thursday,

Saturday, diabetes, hypertension, atrial fibrillation not on anticoagulation,

coronary artery disease (CAD) status post coronary artery bypass graft (CABG),

chronic obstructive pulmonary disease (COPD) and peripheral vascular disease

who presented with complaint of bright red blood per rectum and

lightheadedness.  Patient had recently been admitted earlier this month for

worsening cough and shortness of breath.   He was treated for

community-acquired pneumonia with antibiotics.  He also had complaints of

dysphagia and odynophagia and was noted to have oral candidiasis and was

treated with oral fluconazole and Nystatin.  Patient was also treated for a

possible COPD exacerbation at that time with prednisone with further tapering

on discharge.  On the day prior to his discharge, he was reporting worsening

heartburn and reflux, which did improve with increasing his usual daily

omeprazole to Protonix 40 mg twice a day.  He was discharged home with his

steroid taper and continued followup with nephrology.  The patient was

discharged on March 17, 2021.  



Shortly after discharge, he was reporting episodes of diarrhea with loose

stools about three or four times a day.  On the day of presentation to the

emergency department (ED), he had gotten up from going to the bathroom and felt

lightheaded and had slid off the toilet onto the ground.  He had not actually

fallen or hit his head.  Patient's daughter, who takes care of him, had noticed

some bright red blood in the toilet.  Patient himself is legally blind, so it

is unclear if he was having bloody bowel movements prior to this episode.  He

also reported complaints of abdominal pain and burning sensation as well as

nausea.  



Patient initially was admitted to the medical floor.  He had mild anemia noted,

but was not initially given any transfusion.  He was continued on intravenous

(IV) Protonix twice a day.  He had a CT angiogram on admission which did not

show any evidence of bleeding, but did show some high grade stenosis at the

mesenteric artery.  Patient initially had only a mild lactic acidosis.  He was

then noted, however, to have increasing lactate throughout the evening and had

a near syncopal episode earlier in the evening, around 8:00 p.m.  He was

passing more than 500 mL of maroon-colored stool.  He was not hypotensive at

that time.  He was given normal saline fluid bolus.  A few hours later, his

repeat hemoglobin had dropped to a 6.2.  He was ordered 2 units of packed red

blood cells and surgery was consulted.  Patient was then noted to have

worsening hypotension with systolic blood pressures into the 70s and 60s.  He

was given more IV fluid bolus.  Early in the morning, patient had been

finishing up his second unit of packed red blood cells and he continued to be

hypotensive.  He also continued to have multiple episodes of maroon-colored

stool, some of which appeared more bright red blood.  He was started on

Levophed and transferred to the intensive care unit (ICU).  I was then

contacted by the nocturnist overnight, as the patient was now on Levophed and

there was need for possible central access for the pressors.  At that time, he

had only just been finishing his second unit of packed red blood cells and

patient appeared to be in hemorrhagic shock.  The discussion with the

hospitalist was to increase the rate of his packed red blood cells transfusion

and to give additional blood with order of at least 3 units of packed red blood

cells as well as ordering 2 units of fresh frozen plasma.  The Levophed could

be continued as a temporary measure, although the goal would be to wean him off

of the Levophed, as this is a hemorrhagic shock and he needs blood repletion.



Patient was then given an additional 2 units of packed red blood cells with

more rapid transfusion, which he tolerated well.  He was also given 2 units of

fresh frozen plasma.  After finishing up his packed red blood cells and fresh

frozen plasma, he was able to be weaned off of Levophed early this morning. 

Patient also had some episodes initially when he presented to the ICU of

unresponsiveness and almost apneic breathing and had required some bag mask

ventilation.  With adequate resuscitation, however, he had regained a normal

mental status.  The patient reported continuing burning and some epigastric

discomfort.  He also continued to have episodes of nausea and gagging, but no

vomiting.  



PAST MEDICAL AND SURGICAL HISTORY:

1.  End-stage renal disease on hemodialysis Tuesday, Thursday, Saturday.

2.  Coronary artery disease (CAD) status post coronary artery bypass graft

(CABG).

3.  Diabetes.

4.  Hypertension.

5.  Atrial fibrillation status post pacemaker.

6.  Chronic obstructive pulmonary disease (COPD). 

7.  Legally blind.

8.  Skin cancer status post resection.

9.  Peripheral vascular disease with chronic foot ulcers.

10.  Left arm arteriovenous (AV) fistula.

11.  Cholecystectomy.



FAMILY HISTORY:  Noncontributory.



SOCIAL HISTORY:  Former smoker.  Was four pack a day for five years, quite

smoking 40 years ago.  Previous heavy alcohol use, but has not had any drinks

for the past few months.  Patient is retired, used to work as a .  Lives

with his daughter currently.



HOME MEDICATIONS:

-  amlodipine

-  aspirin

-  Tums

-  vitamin D

-  Colace

-  fluconazole

-  furosemide 40 mg twice a day

-  metoprolol

-  Nystatin

-  pantoprazole 40 mg twice a day

-  prednisone taper

-  Tylenol as needed

-  Mucinex as needed



ALLERGIES:  No known drug allergies.



PHYSICAL EXAMINATION:

VITAL SIGNS:  Temperature 96.7, pulse 72, respirations 18, blood pressure

116/59, oxygen saturation 95% on 2 liters nasal cannula.

INTAKE AND OUTPUT:  In 2.4 liters, out zero reported, although he did have

multiple bowel movements overnight.

GENERAL:  Patient is an elderly male, is lying in bed.  He is alert and

oriented times three and does not appear to be in any acute respiratory

distress.  He appears pale, but is not diaphoretic.

HEENT:  Normocephalic, atraumatic.   Pupils are small, reactive to light. 

Patient is legally blind.  There are moist mucous membranes.

NECK:  Supple.  Trachea is midline.  No palpable cervical adenopathy.

CARDIAC:  Regular rate and rhythm.  Normal S1, S2 with faint murmur.  There is

a pacemaker in place.  

PULMONARY:  Slightly diminished breath sounds bilaterally with no significant

wheezing, rales or rhonchi noted.

ABDOMEN:  Soft, nondistended.   There is mild tenderness in the epigastric

region.  There are no palpable masses.

EXTREMITIES:  There is no significant lower extremity edema noted bilaterally. 

Patient has various ulcers on his heels as well as his toe.  He has diminished

pulses in the extremities.  He has a left arm fistula with a palpable thrill.  



LABORATORY DATA: 

WBC 20.4, hemoglobin 6.2, platelets 165.



Chemistry:  Sodium 133, potassium 5.1, chloride 95, bicarbonate 30, BUN 95,

creatinine 7.3, glucose 215.



Troponin 0.04.



Albumin 2.6, lactic acid increased at 5.4.



ABG:  pH 7.315, pCO2 55.4, pO2 38.7.



IMAGING:

CT angiogram abdomen and pelvis showed atelectasis in the lower lobes with

trace bilateral pleural effusions.  There is severe bilateral renal atrophy

with no hydronephrosis.  There are atherosclerotic calcifications in the

abdominal aorta and the branches.  There is high grade severe stenosis at the

origin at the superior mesenteric artery.  There is also high grade stenosis in

the proximal left superficial femoral artery and the proximal right superficial

femoral artery. 



ASSESSMENT AND PLAN:  Mr. Mendez is an 80-year-old male with a past medical

history of coronary artery disease (CAD) status post coronary artery bypass

graft (CABG), hypertension, diabetes, atrial fibrillation, end-stage renal

disease on hemodialysis, peripheral vascular disease and chronic obstructive

pulmonary disease (COPD), who presented with complaints of lightheadedness and

bright red blood per rectum.  Patient had a recent hospitalization for COPD

exacerbation and pneumonia and he was treated with antibiotics and a steroid

taper.  Shortly after being discharged from the hospital, he was noticing

episodes of loose stool and on the day of his admission, he had a presyncopal

episode where he had slid off the toilet.  Initially, patient had mild anemia

and a mild lactic acidosis.  Over the course of the afternoon and evening,

however, he had multiple episodes of large bowel movements with maroon and

sometimes more bright red bloody bowel movements.  He also had a presyncopal

episode earlier in the evening and was given IV fluid.  He had worsening lactic

acidosis and was also more hypotensive overnight.  He was ordered 2 units of

packed red blood cells with a slow transfusion and he continued to have

episodes of bloody bowel movements and then developed hemorrhagic shock.  He

was started on Levophed and transferred to the ICU.  Critical care was then

consulted and patient was recommended to have more rapid transfusion and

ordered for an additional at least 2 units of packed red blood cells with the

possibility of a third unit, depending on if we were able to get him off the

pressors.  He was also ordered for 2 units of fresh frozen plasma.   This

morning, after receiving his additional 2 units of packed red blood cells and 2

units of fresh frozen plasma, he was weaned off of the Levophed.  Patent's

mental status had also improved, as he was initially somewhat lethargic and had

almost been apneic at times.  



1.  Acute GI bleed.  



a.  Patient was seen with Dr. Mcginnis at the bedside earlier this morning

together.  A nasogastric (NG) tube was placed and there was lavage, which

showed mucus, but no evidence of blood with the lavage.  Patient is therefore

suspected to have a lower GI bleed versus an upper GI bleed, despite his

complaints of epigastric pain and nausea, which is likely at this point due to

severe reflux and gastroesophageal reflux disease (GERD). 



b.  A prior history of lower GI bleed in the past and suspect this is the acute

source of his bleeding.  He did have a CT angiogram, which did not show any

evidence of acute bleeding, but he is ordered now for a nuclear medicine scan

for further evaluation of bleeding, as per GI.



c.  Will continue patient on Protonix twice a day and sucralfate for now.



d.  Patient will be kept nothing by mouth with a plan later on for

esophagogastroduodenoscopy (EGD).



e.  Patient has been given a total of 4 units packed red blood cells now as

well as 2 units of fresh frozen plasma.  As he does have severe uremia with his

end-stage renal disease and with the possibility of dilutional decrease in his

platelets, as evidenced on his platelets trending down, we will give him an

additional 1 unit of platelets.  If there is continued active bleeding, can

also consider desmopressin given the uremia and suspicion of uremic dysfunction

with his platelets as well. 



f.  Given his multiple units of packed red blood cells within a short time

period, will also check ionized calcium, but suspect he will need calcium

repletion.



g.  Will also check disseminated intravascular coagulation (DIC) panel as well,

as he may need further coagulation factor repletion. 



h.  Will continue to trend hemoglobin and hematocrit (H and H).  His hemoglobin

will lag behind active clinical bleeding status.  Of more importance would be

development of hypotension and orthostatic hypotension in particular, in that

case, or more rapid obvious bleeding would transfuse packed red blood cells

without waiting for his H and H.



i.  Patient does have 20 gauge peripheral IVs.  However, we will place a

central line access with a Trialysis catheter, not only for access for rapid

transfusion of blood products if needed, but also for pressors if needed,

although as stated, the pressors would be temporary as with hemorrhagic shock,

the treatment would not be vasopressors but instead adequate blood product

repletion.  The Trialysis catheter would also have the benefit of allowing the

patient to have dialysis via that access instead of his fistula, if needed.



2.  History of coronary artery disease (CAD) status post coronary artery bypass

graft (CABG).  Patient also has history of atrial fibrillation and is status

post pacemaker.



a.  Given his history of coronary artery disease and with the evidence of

peripheral vascular disease and stenosis on his CT angiogram, there is concern

for ischemia, either demand ischemia with his cardiac disease or potential

development of mesenteric ischemia with his hypotension and shock.



b.  Will continue to trend his lactic acid.  It was elevated, likely in the

setting of his hemorrhagic shock, but will repeat now after he has been

adequately resuscitated.



c.  Will also check troponins and monitor his EKG for any concerning ischemic

changes.  



3.  End-stage renal disease on hemodialysis Tuesday, Thursday, Saturday.



a.  Nephrology has been consulted to help manage his hemodialysis.  Will

discuss with nephrology about his new Trialysis catheter, as it can be used for

dialysis if there is concern about hemodynamic instability and he may

potentially need continuous renal replacement therapy (CRRT) instead of regular

hemodialysis.



b.  Patient does have hyperkalemia in the setting of end-stage renal disease. 

There is the possible component from hemolysis from transfusion.  We need to

monitor closely.  He does have severe uremia as well related to his renal

failure, which may be contributing to some of his dysfunctional platelets and

bleeding.



4.  History of chronic obstructive pulmonary disease (COPD) and recent

admission for pneumonia status post antibiotics.  Patient does have

leukocytosis although he has remained afebrile and his lung imaging from his CT

abdomen and pelvis showed more atelectasis rather than focal opacities. 

Suspect the leukocytosis is reactive from his steroid use and perhaps from his

acute GI bleed.  Will continue to monitor.  Currently off of antibiotics.  He

does have some chronic ulcers in his lower extremities, although they do not

appear to have any significant cellulitis currently.  There is a low threshold

given his renal failure, however, for antibiotics.  If there is worsening

leukocytosis or fever, would start empiric treatment with antibiotics.



a.  Will continue with nasal cannula oxygen supplementation to maintain oxygen

saturation about 90%.  Will check an ABG again today.



b.  Patient does not have evidence today currently of significant wheezing and

does not report significant shortness of breath.  Will discontinue his

prednisone that he was finishing up the taper and will start nebulized

bronchodilators.  



Deep venous thrombosis (DVT) prophylaxis.  Thromboembolism deterrent (TEDs)

stockings and sequential compression devices (SCDs).  



GI prophylaxis.  Protonix.



CODE STATUS:  DO NOT RESUSCITATE (DNR) with a trial intubation.



Total critical care time spent not including any procedures approximately 1

hour 55 minutes.

MTDD
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
## 2021-03-24 NOTE — RO
OPERATIVE NOTE

 

DATE OF OPERATION: 03/23/2021



Hemodialysis catheter procedure note



INDICATION: Vascular access.



PRE-PROCEDURE DIAGNOSES:  

1.  Hemorrhagic shock.

2.  Renal failure. 



POST-PROCEDURE DIAGNOSIS: 

1.  Hemorrhagic shock.

2.  Renal failure.



SURGEON: Tomasa Juarez MD





CONSENT: The procedure was performed emergently and permission was implied due

to the emergent nature of this procedure.



DESCRIPTION OF PROCEDURE:  A central line insertion practices form was

completed by an independent observer.  A time-out was performed prior to the

procedure. Full sterile technique was maintained throughout the procedure

including surgical cap, mask, protective eyewear, full gown and sterile gloves.

The patient was placed supine.  The right groin region was prepped using

chlorhexidine scrub and draped in sterile fashion using a full drape and a

sterile probe cover employed. The right femoral vein was identified using

ultrasound. Anesthesia was achieved over the vein using 1% lidocaine. Using

real-time out-of-plane guidance, the introducer needle was inserted into the

right femoral vein under direct ultrasound visualization. Venous blood was

withdrawn. The syringe was removed, and a guidewire was advanced into the

introducer needle.   The introducer needle was removed over the guidewire. A

small incision was made at the skin surface with a scalpel, and a dilator was

exchanged over the guidewire. After appropriate double dilation was obtained,

the dilator was exchanged over the wire for a trialysis hemodialysis catheter.

The wire was removed, and the catheter was sutured in place. A sterile

chlorhexidine-impregnated dressing was placed over the catheter at the

insertion site. The patient tolerated the procedure without any hemodynamic

compromise. At time of procedure completion, all ports were aspirated and

flushed properly. Heparin was instilled in the ports.



Estimated blood loss is less than 3 mL. 

MTDD